# Patient Record
Sex: FEMALE | Race: ASIAN | NOT HISPANIC OR LATINO | URBAN - METROPOLITAN AREA
[De-identification: names, ages, dates, MRNs, and addresses within clinical notes are randomized per-mention and may not be internally consistent; named-entity substitution may affect disease eponyms.]

---

## 2022-09-26 ENCOUNTER — EMERGENCY (EMERGENCY)
Facility: HOSPITAL | Age: 57
LOS: 1 days | Discharge: HOME | End: 2022-09-26
Attending: STUDENT IN AN ORGANIZED HEALTH CARE EDUCATION/TRAINING PROGRAM | Admitting: STUDENT IN AN ORGANIZED HEALTH CARE EDUCATION/TRAINING PROGRAM

## 2022-09-26 VITALS
OXYGEN SATURATION: 100 % | HEART RATE: 74 BPM | DIASTOLIC BLOOD PRESSURE: 74 MMHG | RESPIRATION RATE: 18 BRPM | TEMPERATURE: 98 F | SYSTOLIC BLOOD PRESSURE: 175 MMHG

## 2022-09-26 VITALS
HEART RATE: 50 BPM | SYSTOLIC BLOOD PRESSURE: 145 MMHG | OXYGEN SATURATION: 100 % | DIASTOLIC BLOOD PRESSURE: 64 MMHG | TEMPERATURE: 98 F | RESPIRATION RATE: 18 BRPM

## 2022-09-26 LAB
ALBUMIN SERPL ELPH-MCNC: 4.6 G/DL — SIGNIFICANT CHANGE UP (ref 3.3–5)
ALP SERPL-CCNC: 67 U/L — SIGNIFICANT CHANGE UP (ref 40–120)
ALT FLD-CCNC: 13 U/L — SIGNIFICANT CHANGE UP (ref 4–33)
ANION GAP SERPL CALC-SCNC: 11 MMOL/L — SIGNIFICANT CHANGE UP (ref 7–14)
APPEARANCE UR: CLEAR — SIGNIFICANT CHANGE UP
AST SERPL-CCNC: 29 U/L — SIGNIFICANT CHANGE UP (ref 4–32)
BASOPHILS # BLD AUTO: 0.02 K/UL — SIGNIFICANT CHANGE UP (ref 0–0.2)
BASOPHILS NFR BLD AUTO: 0.5 % — SIGNIFICANT CHANGE UP (ref 0–2)
BILIRUB SERPL-MCNC: 0.6 MG/DL — SIGNIFICANT CHANGE UP (ref 0.2–1.2)
BILIRUB UR-MCNC: NEGATIVE — SIGNIFICANT CHANGE UP
BUN SERPL-MCNC: 12 MG/DL — SIGNIFICANT CHANGE UP (ref 7–23)
CALCIUM SERPL-MCNC: 9.8 MG/DL — SIGNIFICANT CHANGE UP (ref 8.4–10.5)
CHLORIDE SERPL-SCNC: 103 MMOL/L — SIGNIFICANT CHANGE UP (ref 98–107)
CO2 SERPL-SCNC: 25 MMOL/L — SIGNIFICANT CHANGE UP (ref 22–31)
COLOR SPEC: COLORLESS — SIGNIFICANT CHANGE UP
CREAT SERPL-MCNC: 0.82 MG/DL — SIGNIFICANT CHANGE UP (ref 0.5–1.3)
DIFF PNL FLD: NEGATIVE — SIGNIFICANT CHANGE UP
EGFR: 83 ML/MIN/1.73M2 — SIGNIFICANT CHANGE UP
EOSINOPHIL # BLD AUTO: 0.25 K/UL — SIGNIFICANT CHANGE UP (ref 0–0.5)
EOSINOPHIL NFR BLD AUTO: 6.1 % — HIGH (ref 0–6)
GLUCOSE SERPL-MCNC: 103 MG/DL — HIGH (ref 70–99)
GLUCOSE UR QL: NEGATIVE — SIGNIFICANT CHANGE UP
HCT VFR BLD CALC: 36.2 % — SIGNIFICANT CHANGE UP (ref 34.5–45)
HGB BLD-MCNC: 11.7 G/DL — SIGNIFICANT CHANGE UP (ref 11.5–15.5)
IANC: 2.43 K/UL — SIGNIFICANT CHANGE UP (ref 1.8–7.4)
IMM GRANULOCYTES NFR BLD AUTO: 0.5 % — SIGNIFICANT CHANGE UP (ref 0–0.9)
KETONES UR-MCNC: NEGATIVE — SIGNIFICANT CHANGE UP
LACTATE SERPL-SCNC: 1.2 MMOL/L — SIGNIFICANT CHANGE UP (ref 0.5–2)
LEUKOCYTE ESTERASE UR-ACNC: NEGATIVE — SIGNIFICANT CHANGE UP
LIDOCAIN IGE QN: 22 U/L — SIGNIFICANT CHANGE UP (ref 7–60)
LYMPHOCYTES # BLD AUTO: 1.09 K/UL — SIGNIFICANT CHANGE UP (ref 1–3.3)
LYMPHOCYTES # BLD AUTO: 26.7 % — SIGNIFICANT CHANGE UP (ref 13–44)
MCHC RBC-ENTMCNC: 28.4 PG — SIGNIFICANT CHANGE UP (ref 27–34)
MCHC RBC-ENTMCNC: 32.3 GM/DL — SIGNIFICANT CHANGE UP (ref 32–36)
MCV RBC AUTO: 87.9 FL — SIGNIFICANT CHANGE UP (ref 80–100)
MONOCYTES # BLD AUTO: 0.27 K/UL — SIGNIFICANT CHANGE UP (ref 0–0.9)
MONOCYTES NFR BLD AUTO: 6.6 % — SIGNIFICANT CHANGE UP (ref 2–14)
NEUTROPHILS # BLD AUTO: 2.43 K/UL — SIGNIFICANT CHANGE UP (ref 1.8–7.4)
NEUTROPHILS NFR BLD AUTO: 59.6 % — SIGNIFICANT CHANGE UP (ref 43–77)
NITRITE UR-MCNC: NEGATIVE — SIGNIFICANT CHANGE UP
NRBC # BLD: 0 /100 WBCS — SIGNIFICANT CHANGE UP (ref 0–0)
NRBC # FLD: 0 K/UL — SIGNIFICANT CHANGE UP (ref 0–0)
PH UR: 7 — SIGNIFICANT CHANGE UP (ref 5–8)
PLATELET # BLD AUTO: 217 K/UL — SIGNIFICANT CHANGE UP (ref 150–400)
POTASSIUM SERPL-MCNC: 4 MMOL/L — SIGNIFICANT CHANGE UP (ref 3.5–5.3)
POTASSIUM SERPL-SCNC: 4 MMOL/L — SIGNIFICANT CHANGE UP (ref 3.5–5.3)
PROT SERPL-MCNC: 7.2 G/DL — SIGNIFICANT CHANGE UP (ref 6–8.3)
PROT UR-MCNC: NEGATIVE — SIGNIFICANT CHANGE UP
RBC # BLD: 4.12 M/UL — SIGNIFICANT CHANGE UP (ref 3.8–5.2)
RBC # FLD: 12.4 % — SIGNIFICANT CHANGE UP (ref 10.3–14.5)
SODIUM SERPL-SCNC: 139 MMOL/L — SIGNIFICANT CHANGE UP (ref 135–145)
SP GR SPEC: 1.01 — LOW (ref 1.01–1.05)
TROPONIN T, HIGH SENSITIVITY RESULT: 7 NG/L — SIGNIFICANT CHANGE UP
UROBILINOGEN FLD QL: SIGNIFICANT CHANGE UP
WBC # BLD: 4.08 K/UL — SIGNIFICANT CHANGE UP (ref 3.8–10.5)
WBC # FLD AUTO: 4.08 K/UL — SIGNIFICANT CHANGE UP (ref 3.8–10.5)

## 2022-09-26 PROCEDURE — 74177 CT ABD & PELVIS W/CONTRAST: CPT | Mod: 26,MA

## 2022-09-26 PROCEDURE — 99285 EMERGENCY DEPT VISIT HI MDM: CPT

## 2022-09-26 RX ORDER — FAMOTIDINE 10 MG/ML
20 INJECTION INTRAVENOUS DAILY
Refills: 0 | Status: DISCONTINUED | OUTPATIENT
Start: 2022-09-26 | End: 2022-09-29

## 2022-09-26 RX ORDER — MORPHINE SULFATE 50 MG/1
4 CAPSULE, EXTENDED RELEASE ORAL ONCE
Refills: 0 | Status: DISCONTINUED | OUTPATIENT
Start: 2022-09-26 | End: 2022-09-26

## 2022-09-26 RX ADMIN — MORPHINE SULFATE 4 MILLIGRAM(S): 50 CAPSULE, EXTENDED RELEASE ORAL at 12:03

## 2022-09-26 RX ADMIN — FAMOTIDINE 20 MILLIGRAM(S): 10 INJECTION INTRAVENOUS at 12:03

## 2022-09-26 NOTE — ED PROVIDER NOTE - ATTENDING CONTRIBUTION TO CARE
56F h/o HTN, HLD, GERD p/w L sided abd pain x1day. States has chronic abdominal pain, but had acute worsening beginning last night. Notes recent CT 8/25 showing a 1.5cm pancreatic cystic lesion c/w papillary mucinous neoplasm. Describes pain as non-radiating, burning sensation. Took tylenol w/o relief. Also notes burning sensation of chest, non-radiating, without associate sob, dizziness/lightheadedness. Reports NBNB vomiting since the pain started a few weeks ago, unchanged at this time.  Denies fever/chills, known sick contacts or recent travels.   Gen: uncomfortable appearing, nad  CV: rrr  Pulm: clear lungs  Abd: soft, ND, generalized tenderness with no rebound/guarding, no rigidity, no CVA tenderness   Ext: no edema/swelling  Skin: wwp, no rash/petechiae/ecchymosis  MDM: 56F h/o HTN, HLD, GERD with recently dx pancreatic cystic lesion p/w worsening L sided abd pain x1day. DDx includes SBO vs. diverticulitis vs. pancreatitis vs. pancreatic cystic lesion. Will get labs, UA/UC, CT ab/pel. Analgesia and IVF provided.

## 2022-09-26 NOTE — ED PROCEDURE NOTE - PROCEDURE ADDITIONAL DETAILS
Emergency Department Focused Ultrasound performed at patient's bedside for educational purposes. The study will have a follow up study performed or was performed in the direct supervision of an ultrasound trained attending.     f/u CTAP    about [id number] L renal cyst; pt already aware of this renal cyst.

## 2022-09-26 NOTE — ED PROVIDER NOTE - NSFOLLOWUPINSTRUCTIONS_ED_ALL_ED_FT
Your diagnosis: Abdominal Pain (Pancreatic region)     Discharge instructions:    - Please follow up with your Primary Care Doctor and GI doctor. Your CT scan results were explained to you. Your results were stable and similar to the previous CT scan. You should follow up with your PCP and GI doctor within 2-3 days.     - Tylenol up to 650 mg every 8 hours as needed for pain.     - Be sure to return to the ED if you develop new or worsening symptoms. Specific signs and symptoms to be vigilant of: fever or chills, chest pain, difficulty breathing, palpitations, loss of consciousness, headache, vision changes, slurred speech, difficulty swallowing or drooling, facial droop, weakness in the arms or legs, numbness or tingling, abdominal pain, nausea or vomiting, diarrhea, constipation, blood in the stool or urine, pain on urination, difficulty urinating.

## 2022-09-26 NOTE — ED PROVIDER NOTE - PHYSICAL EXAMINATION
Physical Exam:    Gen: NAD, AOx3, non-toxic appearing, able to ambulate without assistance  Head: NCAT  HEENT: EOMI, PEERLA, normal conjunctiva, tongue midline, oral mucosa moist  Lung: CTAB, no respiratory distress, no wheezes/rhonchi/rales B/L, speaking in full sentences  CV: RRR, no murmurs, rubs or gallops  Abd: soft, NT, ND, no guarding, no rigidity, no rebound tenderness, no CVA tenderness   MSK: no visible deformities, ROM normal in UE/LE, no back pain  Neuro: No focal sensory or motor deficits  Skin: Warm, well perfused, no rash, no leg swelling

## 2022-09-26 NOTE — ED ADULT TRIAGE NOTE - CHIEF COMPLAINT QUOTE
patient states" I am having severe pain in my chest, abdomen and right arm since last night" h/o pancreatic cyst and chronic pain which got worst since last night".

## 2022-09-26 NOTE — ED ADULT NURSE NOTE - OBJECTIVE STATEMENT
Pt a&ox3 c/o chest pain radiating to rt shoulder and abdominal pain, intermittent for the past several days. Pt breathing even and unlabored, denies chest pain at present, abd soft, non-tender, non-distended, denies n/v/d, no dysuria/hematuria, afebrile, skin is cool dry and intact, ivl placed, labs collected and sent, medicated as ordered.

## 2022-09-26 NOTE — ED PROVIDER NOTE - PATIENT PORTAL LINK FT
You can access the FollowMyHealth Patient Portal offered by [facility] by registering at the following website: [email or web address]. By joining [facility]’s FollowMyHealth portal, you will also be able to view your health information using other applications (apps) compatible with our system.

## 2022-09-26 NOTE — ED PROVIDER NOTE - OBJECTIVE STATEMENT
55 y/o F with PMHx of HTN, HLD, GERD presenting with left sided abdominal pain since last night. States she recently had a CT scan don 08/25/22 showing a 1.5cm pancreatic cystic lesion representing a small side branch intraductal papillary mucinous neoplasm without evidence of worrisome features. States she was referred by her PCP Dr. Helen Pizarro to a GI specialist Dr. Rajendra Thapa for the scan. States the abdominal pain is intermittent, non radiating and a 5/10. States she also has had a "burning sensation" in her chest. States that she has taken Tylenol OTC for some relief. States she has had nonbloody nonbilious vomiting since the pain started a few weeks ago but denies any vomiting this morning. Denies any fever, chills, shortness of breath, diarrhea. States hx of previous abdominal surgeries with hysterectomy and cholecystectomy

## 2022-09-26 NOTE — ED PROVIDER NOTE - PROGRESS NOTE DETAILS
Resident: Shadi Pineda, PGY1 – Pt was re-evaluated at bedside, VSS, feeling better overall. Results were discussed with patient and explained stable CT results in comparison to previous scan done outpatient. Time was taken to answer any questions that the patient had before providing them with discharge paperwork. Patient to be PO challenged prior to discharge and explained to f/u with GI (Dr. Rajendra Thapa) Resident: Shadi Pineda, PGY1 – Pt was re-evaluated at bedside, VSS, feeling better overall. Results were discussed with patient and explained stable CT results in comparison to previous scan done outpatient. Time was taken to answer any questions that the patient had before providing them with discharge paperwork. Patient to be PO challenged prior to discharge and passed and explained to f/u with GI (Dr. Rajendra Thapa)

## 2022-09-26 NOTE — ED PROVIDER NOTE - NS ED ROS FT
CONSTITUTIONAL: No fevers, no chills  EYES: no visual changes, no eye pain  EARS: no ear drainage, no ear pain, no change in hearing  NOSE: no nasal congestion  MOUTH/THROAT: no sore throat  CV: +chest pain, no palpitations  RESP: No SOB, no cough  GI: +abdominal pain   : no dysuria, no hematuria, no flank pain  MSK: no back pain, no extremity pain  SKIN: no rashes

## 2022-09-26 NOTE — ED ADULT NURSE REASSESSMENT NOTE - NS ED NURSE REASSESS COMMENT FT1
Break coverage RN: Pt A&Ox4 resting on stretcher. Respirations even and unlabored. Pt offers no complaints at this time. Urine sent per order. pending CT results. bed in lowest position, side rails up, call bell in hand, safety maintained.

## 2022-10-03 ENCOUNTER — INPATIENT (INPATIENT)
Facility: HOSPITAL | Age: 57
LOS: 1 days | Discharge: HOME | End: 2022-10-05
Attending: INTERNAL MEDICINE | Admitting: INTERNAL MEDICINE

## 2022-10-03 VITALS
RESPIRATION RATE: 16 BRPM | DIASTOLIC BLOOD PRESSURE: 72 MMHG | TEMPERATURE: 98 F | SYSTOLIC BLOOD PRESSURE: 154 MMHG | OXYGEN SATURATION: 100 % | HEART RATE: 69 BPM

## 2022-10-03 DIAGNOSIS — K86.9 DISEASE OF PANCREAS, UNSPECIFIED: ICD-10-CM

## 2022-10-03 DIAGNOSIS — Z98.890 OTHER SPECIFIED POSTPROCEDURAL STATES: Chronic | ICD-10-CM

## 2022-10-03 DIAGNOSIS — Z90.710 ACQUIRED ABSENCE OF BOTH CERVIX AND UTERUS: Chronic | ICD-10-CM

## 2022-10-03 DIAGNOSIS — Z90.49 ACQUIRED ABSENCE OF OTHER SPECIFIED PARTS OF DIGESTIVE TRACT: Chronic | ICD-10-CM

## 2022-10-03 DIAGNOSIS — Z98.42 CATARACT EXTRACTION STATUS, LEFT EYE: Chronic | ICD-10-CM

## 2022-10-03 LAB
ALBUMIN SERPL ELPH-MCNC: 4.9 G/DL — SIGNIFICANT CHANGE UP (ref 3.3–5)
ALP SERPL-CCNC: 72 U/L — SIGNIFICANT CHANGE UP (ref 40–120)
ALT FLD-CCNC: 15 U/L — SIGNIFICANT CHANGE UP (ref 4–33)
ANION GAP SERPL CALC-SCNC: 14 MMOL/L — SIGNIFICANT CHANGE UP (ref 7–14)
AST SERPL-CCNC: 19 U/L — SIGNIFICANT CHANGE UP (ref 4–32)
BASOPHILS # BLD AUTO: 0.03 K/UL — SIGNIFICANT CHANGE UP (ref 0–0.2)
BASOPHILS NFR BLD AUTO: 0.6 % — SIGNIFICANT CHANGE UP (ref 0–2)
BILIRUB SERPL-MCNC: 0.4 MG/DL — SIGNIFICANT CHANGE UP (ref 0.2–1.2)
BUN SERPL-MCNC: 14 MG/DL — SIGNIFICANT CHANGE UP (ref 7–23)
CALCIUM SERPL-MCNC: 9.5 MG/DL — SIGNIFICANT CHANGE UP (ref 8.4–10.5)
CHLORIDE SERPL-SCNC: 105 MMOL/L — SIGNIFICANT CHANGE UP (ref 98–107)
CO2 SERPL-SCNC: 24 MMOL/L — SIGNIFICANT CHANGE UP (ref 22–31)
CREAT SERPL-MCNC: 0.88 MG/DL — SIGNIFICANT CHANGE UP (ref 0.5–1.3)
EGFR: 77 ML/MIN/1.73M2 — SIGNIFICANT CHANGE UP
EOSINOPHIL # BLD AUTO: 0.22 K/UL — SIGNIFICANT CHANGE UP (ref 0–0.5)
EOSINOPHIL NFR BLD AUTO: 4.2 % — SIGNIFICANT CHANGE UP (ref 0–6)
GLUCOSE SERPL-MCNC: 94 MG/DL — SIGNIFICANT CHANGE UP (ref 70–99)
HCT VFR BLD CALC: 40.7 % — SIGNIFICANT CHANGE UP (ref 34.5–45)
HGB BLD-MCNC: 12.6 G/DL — SIGNIFICANT CHANGE UP (ref 11.5–15.5)
IANC: 3.01 K/UL — SIGNIFICANT CHANGE UP (ref 1.8–7.4)
IMM GRANULOCYTES NFR BLD AUTO: 0.4 % — SIGNIFICANT CHANGE UP (ref 0–0.9)
LIDOCAIN IGE QN: 28 U/L — SIGNIFICANT CHANGE UP (ref 7–60)
LYMPHOCYTES # BLD AUTO: 1.54 K/UL — SIGNIFICANT CHANGE UP (ref 1–3.3)
LYMPHOCYTES # BLD AUTO: 29.7 % — SIGNIFICANT CHANGE UP (ref 13–44)
MCHC RBC-ENTMCNC: 28.3 PG — SIGNIFICANT CHANGE UP (ref 27–34)
MCHC RBC-ENTMCNC: 31 GM/DL — LOW (ref 32–36)
MCV RBC AUTO: 91.5 FL — SIGNIFICANT CHANGE UP (ref 80–100)
MONOCYTES # BLD AUTO: 0.37 K/UL — SIGNIFICANT CHANGE UP (ref 0–0.9)
MONOCYTES NFR BLD AUTO: 7.1 % — SIGNIFICANT CHANGE UP (ref 2–14)
NEUTROPHILS # BLD AUTO: 3.01 K/UL — SIGNIFICANT CHANGE UP (ref 1.8–7.4)
NEUTROPHILS NFR BLD AUTO: 58 % — SIGNIFICANT CHANGE UP (ref 43–77)
NRBC # BLD: 0 /100 WBCS — SIGNIFICANT CHANGE UP (ref 0–0)
NRBC # FLD: 0 K/UL — SIGNIFICANT CHANGE UP (ref 0–0)
PLATELET # BLD AUTO: 225 K/UL — SIGNIFICANT CHANGE UP (ref 150–400)
POTASSIUM SERPL-MCNC: 3.6 MMOL/L — SIGNIFICANT CHANGE UP (ref 3.5–5.3)
POTASSIUM SERPL-SCNC: 3.6 MMOL/L — SIGNIFICANT CHANGE UP (ref 3.5–5.3)
PROT SERPL-MCNC: 7.5 G/DL — SIGNIFICANT CHANGE UP (ref 6–8.3)
RBC # BLD: 4.45 M/UL — SIGNIFICANT CHANGE UP (ref 3.8–5.2)
RBC # FLD: 12.6 % — SIGNIFICANT CHANGE UP (ref 10.3–14.5)
SODIUM SERPL-SCNC: 143 MMOL/L — SIGNIFICANT CHANGE UP (ref 135–145)
WBC # BLD: 5.19 K/UL — SIGNIFICANT CHANGE UP (ref 3.8–10.5)
WBC # FLD AUTO: 5.19 K/UL — SIGNIFICANT CHANGE UP (ref 3.8–10.5)

## 2022-10-03 PROCEDURE — 99285 EMERGENCY DEPT VISIT HI MDM: CPT

## 2022-10-03 RX ORDER — ATORVASTATIN CALCIUM 80 MG/1
10 TABLET, FILM COATED ORAL AT BEDTIME
Refills: 0 | Status: DISCONTINUED | OUTPATIENT
Start: 2022-10-03 | End: 2022-10-05

## 2022-10-03 RX ORDER — SUCRALFATE 1 G
1 TABLET ORAL
Refills: 0 | Status: DISCONTINUED | OUTPATIENT
Start: 2022-10-03 | End: 2022-10-05

## 2022-10-03 RX ORDER — PANTOPRAZOLE SODIUM 20 MG/1
40 TABLET, DELAYED RELEASE ORAL
Refills: 0 | Status: DISCONTINUED | OUTPATIENT
Start: 2022-10-03 | End: 2022-10-05

## 2022-10-03 RX ORDER — SODIUM CHLORIDE 9 MG/ML
1000 INJECTION INTRAMUSCULAR; INTRAVENOUS; SUBCUTANEOUS ONCE
Refills: 0 | Status: COMPLETED | OUTPATIENT
Start: 2022-10-03 | End: 2022-10-03

## 2022-10-03 RX ORDER — MORPHINE SULFATE 50 MG/1
2 CAPSULE, EXTENDED RELEASE ORAL ONCE
Refills: 0 | Status: DISCONTINUED | OUTPATIENT
Start: 2022-10-03 | End: 2022-10-03

## 2022-10-03 RX ORDER — ACETAMINOPHEN 500 MG
650 TABLET ORAL EVERY 6 HOURS
Refills: 0 | Status: DISCONTINUED | OUTPATIENT
Start: 2022-10-03 | End: 2022-10-05

## 2022-10-03 RX ADMIN — SODIUM CHLORIDE 1000 MILLILITER(S): 9 INJECTION INTRAMUSCULAR; INTRAVENOUS; SUBCUTANEOUS at 20:16

## 2022-10-03 NOTE — H&P ADULT - NSICDXFAMILYHX_GEN_ALL_CORE_FT
FAMILY HISTORY:  Father  Still living? Unknown  FHx: hepatic cirrhosis, Age at diagnosis: Age Unknown    Mother  Still living? Unknown  FH: hypertension, Age at diagnosis: Age Unknown  FHx: stroke, Age at diagnosis: Age Unknown

## 2022-10-03 NOTE — H&P ADULT - NSHPSOCIALHISTORY_GEN_ALL_CORE
Denies alcohol, tobacco, and substance use  Denies herbal medication and supplement use   Lives in [place] with    Only one relative nearby, the patient's PCP  Previously worked in [employer], since retired 15 years ago   Emigrated from [facility] 18 years ago

## 2022-10-03 NOTE — ED ADULT NURSE NOTE - NSIMPLEMENTINTERV_GEN_ALL_ED
Implemented All Universal Safety Interventions:  [place] to call system. Call bell, personal items and telephone within reach. Instruct patient to call for assistance. Room bathroom lighting operational. Non-slip footwear when patient is off stretcher. Physically safe environment: no spills, clutter or unnecessary equipment. Stretcher in lowest position, wheels locked, appropriate side rails in place.

## 2022-10-03 NOTE — H&P ADULT - NSICDXPASTMEDICALHX_GEN_ALL_CORE_FT
PAST MEDICAL HISTORY:  GERD (gastroesophageal reflux disease)     H/O varicose veins     HLD (hyperlipidemia)     HTN (hypertension)     Umbilical hernia      PAST MEDICAL HISTORY:  GERD (gastroesophageal reflux disease)     Gout     H/O varicose veins     HLD (hyperlipidemia)     HTN (hypertension)     Umbilical hernia

## 2022-10-03 NOTE — H&P ADULT - PROBLEM SELECTOR PLAN 2
CT abdomen shows 1.4 cm pancreatic cystic lesion and additional subcentimeter hypodensity   - GI consult   - May require repeat of MRI CT abdomen shows 1.4 cm pancreatic cystic lesion and additional subcentimeter hypodensity   - GI consult   - May require repeat of MRI, was told by her GI that she would need EUS

## 2022-10-03 NOTE — H&P ADULT - NSHPPHYSICALEXAM_GEN_ALL_CORE
VITALS:   T(C): 37 (10-03-22 @ 23:53), Max: 37 (10-03-22 @ 23:53)  HR: 66 (10-03-22 @ 23:53) (66 - 69)  BP: 131/91 (10-03-22 @ 23:53) (131/91 - 154/72)  RR: 18 (10-03-22 @ 23:53) (16 - 18)  SpO2: 100% (10-03-22 @ 23:53) (100% - 100%)    GENERAL: NAD, lying in bed comfortably  HEAD:  Atraumatic, normocephalic  EYES: EOMI, PERRLA, conjunctiva and sclera clear  ENT: Moist mucous membranes  NECK: Supple, no JVD  HEART: Regular rate and rhythm, no murmurs, rubs, or gallops  LUNGS: Unlabored respirations.  Clear to auscultation bilaterally, no crackles, wheezing, or rhonchi  ABDOMEN: Soft, tender to palpation in LUQ, LLQ, epigastric region, nondistended, umbilical hernia mildly tender   EXTREMITIES: 2+ peripheral pulses bilaterally. No clubbing, cyanosis, or edema  NERVOUS SYSTEM:  A&Ox3, no focal deficits   SKIN: B/l LE pruritic rashes VITALS:   T(C): 37 (10-03-22 @ 23:53), Max: 37 (10-03-22 @ 23:53)  HR: 66 (10-03-22 @ 23:53) (66 - 69)  BP: 131/91 (10-03-22 @ 23:53) (131/91 - 154/72)  RR: 18 (10-03-22 @ 23:53) (16 - 18)  SpO2: 100% (10-03-22 @ 23:53) (100% - 100%)    GENERAL: NAD, lying in bed comfortably  HEAD:  Atraumatic, normocephalic  EYES: EOMI, PERRLA, conjunctiva and sclera clear  ENT: Moist mucous membranes  NECK: Supple, no JVD  HEART: Regular rate and rhythm, no murmurs, rubs, or gallops  LUNGS: Unlabored respirations.  Clear to auscultation bilaterally, no crackles, wheezing, or rhonchi  ABDOMEN: Soft, tender to palpation in LUQ, LLQ, epigastric region, nondistended, umbilical hernia mildly tender   EXTREMITIES: 2+ peripheral pulses bilaterally. No clubbing, cyanosis, or edema  NERVOUS SYSTEM:  A&Ox3, no focal deficits   SKIN: B/l mild LE pruritic rashes

## 2022-10-03 NOTE — H&P ADULT - NSHPREVIEWOFSYSTEMS_GEN_ALL_CORE
REVIEW OF SYSTEMS:    CONSTITUTIONAL: No weakness, fevers or chills  EYES/ENT: No visual changes;  No vertigo or throat pain   NECK: No pain or stiffness  RESPIRATORY: No cough, wheezing, hemoptysis; No shortness of breath  CARDIOVASCULAR: No chest pain or palpitations  GASTROINTESTINAL: No abdominal or epigastric pain. No nausea, vomiting, or hematemesis; No diarrhea or constipation. No melena or hematochezia.  GENITOURINARY: No dysuria, frequency or hematuria  NEUROLOGICAL: No numbness or weakness  SKIN: No itching, rashes REVIEW OF SYSTEMS:    CONSTITUTIONAL: + weakness, No fevers or chills  EYES/ENT: No visual changes;  No vertigo or throat pain   NECK: No pain or stiffness  RESPIRATORY: No cough, wheezing, hemoptysis; No shortness of breath  CARDIOVASCULAR: No chest pain or palpitations  GASTROINTESTINAL: + abdominal and epigastric pain. + nausea, No vomiting, or hematemesis; No diarrhea or constipation. No melena or hematochezia.  GENITOURINARY: No dysuria, frequency or hematuria  NEUROLOGICAL: No numbness or weakness  SKIN: Chronic LE rashes REVIEW OF SYSTEMS:    CONSTITUTIONAL: + weakness, No fevers or chills  EYES/ENT: No visual changes;  No vertigo or throat pain   NECK: No pain or stiffness  RESPIRATORY: No cough, wheezing, hemoptysis; No shortness of breath  CARDIOVASCULAR: No chest pain or palpitations  GASTROINTESTINAL: + abdominal and epigastric pain. + nausea, No vomiting, or hematemesis; No diarrhea or constipation. No melena or hematochezia.  GENITOURINARY: No dysuria, frequency or hematuria  NEUROLOGICAL: No numbness or weakness  MSK: no back pain  SKIN: Chronic LE rashes

## 2022-10-03 NOTE — H&P ADULT - NSHPLABSRESULTS_GEN_ALL_CORE
Comprehensive Metabolic Panel (10.03.22 @ 18:10)   Sodium, Serum: 143 mmol/L   Potassium, Serum: 3.6 mmol/L   Chloride, Serum: 105 mmol/L   Carbon Dioxide, Serum: 24 mmol/L   Anion Gap, Serum: 14 mmol/L   Blood Urea Nitrogen, Serum: 14 mg/dL   Creatinine, Serum: 0.88 mg/dL   Glucose, Serum: 94 mg/dL   Calcium, Total Serum: 9.5 mg/dL   Protein Total, Serum: 7.5 g/dL   Albumin, Serum: 4.9 g/dL   Bilirubin Total, Serum: 0.4 mg/dL   Alkaline Phosphatase, Serum: 72 U/L   Aspartate Aminotransferase (AST/SGOT): 19 U/L   Alanine Aminotransferase (ALT/SGPT): 15 U/L   eGFR: 77Complete Blood Count + Automated Diff (10.03.22 @ 18:10)   IANC: 3.01: IANC (instrument absolute neutrophil count) is based on the instrument   calculation which may differ from ANC (manual absolute neutrophil count)   since it is based on the calculation from a manual differential. K/uL   Nucleated RBC #: 0.00 K/uL   WBC Count: 5.19 K/uL   RBC Count: 4.45 M/uL   Hemoglobin: 12.6 g/dL   Hematocrit: 40.7 %   Mean Cell Volume: 91.5 fL   Mean Cell Hemoglobin: 28.3 pg   Mean Cell Hemoglobin Conc: 31.0 gm/dL   Red Cell Distrib Width: 12.6 %   Platelet Count - Automated: 225 K/uL   Auto Neutrophil #: 3.01 K/uL   Auto Lymphocyte #: 1.54 K/uL   Auto Monocyte #: 0.37 K/uL   Auto Eosinophil #: 0.22 K/uL   Auto Basophil #: 0.03 K/uL   Auto Neutrophil %: 58.0: Differential percentages must be correlated with absolute numbers for   clinical significance. %   Auto Lymphocyte %: 29.7 %   Auto Monocyte %: 7.1 %   Auto Eosinophil %: 4.2 %   Auto Basophil %: 0.6 %   Auto Immature Granulocyte %: 0.4: (Includes meta, myelo and promyelocytes). Mild elevations in immature   granulocytes may be seen with many inflammatory processes and pregnancy;   clinical correlation suggested. %   Nucleated RBC: 0 /100 WBCs Urinalysis (09.26.22 @ 16:26)   Glucose Qualitative, Urine: Negative   Blood, Urine: Negative   pH Urine: 7.0   Color: Colorless   Urine Appearance: Clear   Bilirubin: Negative   Ketone - Urine: Negative   Specific Gravity: 1.007   Protein, Urine: Negative   Urobilinogen: <2 mg/dL   Nitrite: Negative   Leukocyte Esterase Concentration: Negative < from: CT Abdomen and Pelvis w/ IV Cont (09.26.22 @ 15:53) >    IMPRESSION:  No acute intra-abdominal findings.    1.4 cm pancreatic cystic lesion and additional subcentimeter hypodensity.   Recommend correlation with prior imaging if available for comparison or   follow-up with nonemergent MRI/MRCP for further characterization.    < end of copied text > Comprehensive Metabolic Panel (10.03.22 @ 18:10)   Sodium, Serum: 143 mmol/L   Potassium, Serum: 3.6 mmol/L   Chloride, Serum: 105 mmol/L   Carbon Dioxide, Serum: 24 mmol/L   Anion Gap, Serum: 14 mmol/L   Blood Urea Nitrogen, Serum: 14 mg/dL   Creatinine, Serum: 0.88 mg/dL   Glucose, Serum: 94 mg/dL   Calcium, Total Serum: 9.5 mg/dL   Protein Total, Serum: 7.5 g/dL   Albumin, Serum: 4.9 g/dL   Bilirubin Total, Serum: 0.4 mg/dL   Alkaline Phosphatase, Serum: 72 U/L   Aspartate Aminotransferase (AST/SGOT): 19 U/L   Alanine Aminotransferase (ALT/SGPT): 15 U/L   eGFR: 77Complete Blood Count + Automated Diff (10.03.22 @ 18:10)   IANC: 3.01: IANC (instrument absolute neutrophil count) is based on the instrument   calculation which may differ from ANC (manual absolute neutrophil count)   since it is based on the calculation from a manual differential. K/uL   Nucleated RBC #: 0.00 K/uL   WBC Count: 5.19 K/uL   RBC Count: 4.45 M/uL   Hemoglobin: 12.6 g/dL   Hematocrit: 40.7 %   Mean Cell Volume: 91.5 fL   Mean Cell Hemoglobin: 28.3 pg   Mean Cell Hemoglobin Conc: 31.0 gm/dL   Red Cell Distrib Width: 12.6 %   Platelet Count - Automated: 225 K/uL   Auto Neutrophil #: 3.01 K/uL   Auto Lymphocyte #: 1.54 K/uL   Auto Monocyte #: 0.37 K/uL   Auto Eosinophil #: 0.22 K/uL   Auto Basophil #: 0.03 K/uL   Auto Neutrophil %: 58.0: Differential percentages must be correlated with absolute numbers for   clinical significance. %   Auto Lymphocyte %: 29.7 %   Auto Monocyte %: 7.1 %   Auto Eosinophil %: 4.2 %   Auto Basophil %: 0.6 %   Auto Immature Granulocyte %: 0.4: (Includes meta, myelo and promyelocytes). Mild elevations in immature   granulocytes may be seen with many inflammatory processes and pregnancy;   clinical correlation suggested. %   Nucleated RBC: 0 /100 WBCs Urinalysis (09.26.22 @ 16:26)   Glucose Qualitative, Urine: Negative   Blood, Urine: Negative   pH Urine: 7.0   Color: Colorless   Urine Appearance: Clear   Bilirubin: Negative   Ketone - Urine: Negative   Specific Gravity: 1.007   Protein, Urine: Negative   Urobilinogen: <2 mg/dL   Nitrite: Negative   Leukocyte Esterase Concentration: Negative < from: CT Abdomen and Pelvis w/ IV Cont (09.26.22 @ 15:53) >    IMPRESSION:  No acute intra-abdominal findings.    1.4 cm pancreatic cystic lesion and additional subcentimeter hypodensity.   Recommend correlation with prior imaging if available for comparison or   follow-up with nonemergent MRI/MRCP for further characterization.    < end of copied text >    EKG: normal sinus rhythm

## 2022-10-03 NOTE — ED ADULT NURSE REASSESSMENT NOTE - SYMPTOMS
better now decreased to 3-4, doesn't want Instructions given not to drive or drink alcohol after taking percocet, verbalized understanding. Family here to drive patient home. at this time/abdominal pain

## 2022-10-03 NOTE — H&P ADULT - ATTENDING COMMENTS
55 yo F with PMHx of mult abd surgeries, hyperlipidemia, GERD, hypertension, umbilical hernia, and recent diagnosis of 1.4 cm pancreatic cystic lesion (was told likely IPMN) presents to the hospital with continued epigastric/periumbilical abd pain ongoing for a while, acute on chronic and at times worse with food. She was told by her GI that she needed EUS. She has known gastric ulcer from EGD recently and is on PPI. Her vitals were stable on arrival. Exam minimally ttp epigastrum. Will continue home PPI and sucralfate. Consult GI this AM.

## 2022-10-03 NOTE — ED PROVIDER NOTE - CLINICAL SUMMARY MEDICAL DECISION MAKING FREE TEXT BOX
Will send labs and admit the pt for intractable pain and further management of the pancreatic lesion.

## 2022-10-03 NOTE — H&P ADULT - PROBLEM SELECTOR PLAN 1
Pancreatic cystic lesion vs. gastritis/peptic ulcer disease   Less concern for pancreatitis and bowel obstruction due to low lipase and ability to tolerate soft foods without vomiting   - Keep NPO   - Tylenol PRN for pain Pancreatic cystic lesion vs. gastritis/peptic ulcer disease   Less concern for pancreatitis and bowel obstruction due to low lipase and ability to tolerate soft foods without vomiting   - Keep NPO except medications   - Tylenol PRN for pain Pancreatic cystic lesion vs. gastritis/peptic ulcer disease   Less concern for pancreatitis and bowel obstruction due to low lipase and ability to tolerate soft foods without vomiting   - Keep NPO except medications   - Tylenol PRN for pain  - continue PPI  - GI consult this AM

## 2022-10-03 NOTE — H&P ADULT - ASSESSMENT
55 yo F with PMHx of hyperlipidemia, GERD, hypertension, umbilical hernia, and gout presents with persistent abdominal pain.

## 2022-10-03 NOTE — H&P ADULT - HISTORY OF PRESENT ILLNESS
57 yo F with PMHx of hyperlipidemia, GERD, hypertension, umbilical hernia presents with  55 yo F with PMHx of hyperlipidemia, GERD, hypertension, umbilical hernia presents with abdominal pain, persistent, worsen in past 1 day. Patient was previously in ED for abdominal pain in LUQ on 9/26/2022, was evaluated with CT scan, which showed IPMN in pancreas, and was subsequently discharged. Patient reports recent pain worsened prior to breakfast, associated with nausea and desire to vomit, without actual emesis. Patient also reports   Patient was also seen by outpatient GI Rajendra Esteban, who sent the patient for ultrasound of the abdomen, which confirmed a ventral hernia and a pancreatic cystic lesion 1.5 cm without evidence of "worrisome features", as well as an MRI abdomen, which showed mildly dilated CBD w/o intrahepatic biliary dilation s/p cholecystectomy but did not visualize the pancreatic cyst. Patient reports she previously underwent an EGD one month ago and a colonoscopy 4 months ago, only notable for a "gastric ulcer". Patient was told by outpatient GI to come to ED for endoscopic "biopsies".  55 yo F with PMHx of hyperlipidemia, GERD, hypertension, umbilical hernia, and gout presents with abdominal pain, persistent, worsened in past 1 day. Patient was previously in ED for abdominal pain in [facility] on 9/26/2022, was evaluated with CT scan, which showed 1.4 cm pancreatic cystic lesion and additional subcentimeter hypodensity, and was subsequently discharged without admission. Patient reports recent pain worsened prior to breakfast, associated with nausea and desire to vomit, without actual emesis. Pain was initially 5-6/10, but improved to 3-4/10 after taking tylenol. Patient also reports gas and epigastric "burning" associated with eating food, improved with eating softer foods. Patient also endorses dizziness with occasional "light-headedness" but denies syncope or falling. Patient denies shortness of breath, chest pain, dysuria, or constipation.   Patient was also seen by outpatient GI Rajendra Esteban, who sent the patient for ultrasound of the abdomen, which confirmed a ventral hernia and a "1.5 cm pancreatic cystic lesion, likely representing a small side-branch IPMN without evidence of worrisome features", as well as an MRI abdomen, which showed mildly dilated CBD w/o intrahepatic biliary dilation s/p cholecystectomy but did not visualize the pancreatic cyst. Patient reports she previously underwent an EGD and colonoscopy 4 months ago, only notable for a "gastric ulcer". Patient was told by outpatient GI to come to ED for endoscopic "biopsies".

## 2022-10-03 NOTE — H&P ADULT - NSICDXPASTSURGICALHX_GEN_ALL_CORE_FT
PAST SURGICAL HISTORY:  S/P cholecystectomy     S/P hysterectomy     S/P sclerotherapy of varicose veins     Status post laser cataract surgery of left eye

## 2022-10-03 NOTE — ED ADULT NURSE NOTE - OBJECTIVE STATEMENT
55 y/o A&Ox4, PMH of "cyst on pancreas and liver", presents to ED for abdominal pain. Respirations are even and unlabored, sating at 100% on RA, 20 G to L aC placed, labs sent.

## 2022-10-03 NOTE — ED ADULT NURSE NOTE - NSFALLRSKASSESSDT_ED_ALL_ED
Quality 226: Preventive Care And Screening: Tobacco Use: Screening And Cessation Intervention: Patient screened for tobacco use and is an ex/non-smoker
Detail Level: Detailed
Quality 130: Documentation Of Current Medications In The Medical Record: Current Medications Documented
Quality 431: Preventive Care And Screening: Unhealthy Alcohol Use - Screening: Patient screened for unhealthy alcohol use using a single question and scores less than 2 times per year
03-Oct-2022 20:19

## 2022-10-03 NOTE — ED ADULT TRIAGE NOTE - CHIEF COMPLAINT QUOTE
Pt. c/o LUQ pain radiating to back, nausea and dizziness. States she has a cyst on her pancreas and liver. Seen last week for the same thing. Denies vomiting or fevers.

## 2022-10-04 DIAGNOSIS — I10 ESSENTIAL (PRIMARY) HYPERTENSION: ICD-10-CM

## 2022-10-04 DIAGNOSIS — E78.5 HYPERLIPIDEMIA, UNSPECIFIED: ICD-10-CM

## 2022-10-04 DIAGNOSIS — R10.9 UNSPECIFIED ABDOMINAL PAIN: ICD-10-CM

## 2022-10-04 DIAGNOSIS — K86.2 CYST OF PANCREAS: ICD-10-CM

## 2022-10-04 DIAGNOSIS — Z29.9 ENCOUNTER FOR PROPHYLACTIC MEASURES, UNSPECIFIED: ICD-10-CM

## 2022-10-04 DIAGNOSIS — K21.9 GASTRO-ESOPHAGEAL REFLUX DISEASE WITHOUT ESOPHAGITIS: ICD-10-CM

## 2022-10-04 LAB
ANION GAP SERPL CALC-SCNC: 12 MMOL/L — SIGNIFICANT CHANGE UP (ref 7–14)
BUN SERPL-MCNC: 12 MG/DL — SIGNIFICANT CHANGE UP (ref 7–23)
CALCIUM SERPL-MCNC: 8.8 MG/DL — SIGNIFICANT CHANGE UP (ref 8.4–10.5)
CHLORIDE SERPL-SCNC: 109 MMOL/L — HIGH (ref 98–107)
CO2 SERPL-SCNC: 25 MMOL/L — SIGNIFICANT CHANGE UP (ref 22–31)
CREAT SERPL-MCNC: 0.86 MG/DL — SIGNIFICANT CHANGE UP (ref 0.5–1.3)
EGFR: 79 ML/MIN/1.73M2 — SIGNIFICANT CHANGE UP
FLUAV AG NPH QL: SIGNIFICANT CHANGE UP
FLUBV AG NPH QL: SIGNIFICANT CHANGE UP
GLUCOSE BLDC GLUCOMTR-MCNC: 87 MG/DL — SIGNIFICANT CHANGE UP (ref 70–99)
GLUCOSE SERPL-MCNC: 88 MG/DL — SIGNIFICANT CHANGE UP (ref 70–99)
HCT VFR BLD CALC: 35 % — SIGNIFICANT CHANGE UP (ref 34.5–45)
HCV AB S/CO SERPL IA: 0.1 S/CO — SIGNIFICANT CHANGE UP (ref 0–0.99)
HCV AB SERPL-IMP: SIGNIFICANT CHANGE UP
HGB BLD-MCNC: 11.2 G/DL — LOW (ref 11.5–15.5)
MAGNESIUM SERPL-MCNC: 2.1 MG/DL — SIGNIFICANT CHANGE UP (ref 1.6–2.6)
MCHC RBC-ENTMCNC: 28.2 PG — SIGNIFICANT CHANGE UP (ref 27–34)
MCHC RBC-ENTMCNC: 32 GM/DL — SIGNIFICANT CHANGE UP (ref 32–36)
MCV RBC AUTO: 88.2 FL — SIGNIFICANT CHANGE UP (ref 80–100)
NRBC # BLD: 0 /100 WBCS — SIGNIFICANT CHANGE UP (ref 0–0)
NRBC # FLD: 0 K/UL — SIGNIFICANT CHANGE UP (ref 0–0)
PHOSPHATE SERPL-MCNC: 3.1 MG/DL — SIGNIFICANT CHANGE UP (ref 2.5–4.5)
PLATELET # BLD AUTO: 197 K/UL — SIGNIFICANT CHANGE UP (ref 150–400)
POTASSIUM SERPL-MCNC: 3.5 MMOL/L — SIGNIFICANT CHANGE UP (ref 3.5–5.3)
POTASSIUM SERPL-SCNC: 3.5 MMOL/L — SIGNIFICANT CHANGE UP (ref 3.5–5.3)
RBC # BLD: 3.97 M/UL — SIGNIFICANT CHANGE UP (ref 3.8–5.2)
RBC # FLD: 12.6 % — SIGNIFICANT CHANGE UP (ref 10.3–14.5)
RSV RNA NPH QL NAA+NON-PROBE: SIGNIFICANT CHANGE UP
SARS-COV-2 RNA SPEC QL NAA+PROBE: SIGNIFICANT CHANGE UP
SODIUM SERPL-SCNC: 146 MMOL/L — HIGH (ref 135–145)
WBC # BLD: 4.82 K/UL — SIGNIFICANT CHANGE UP (ref 3.8–10.5)
WBC # FLD AUTO: 4.82 K/UL — SIGNIFICANT CHANGE UP (ref 3.8–10.5)

## 2022-10-04 PROCEDURE — 43239 EGD BIOPSY SINGLE/MULTIPLE: CPT | Mod: 59,GC

## 2022-10-04 PROCEDURE — 12345: CPT | Mod: NC

## 2022-10-04 PROCEDURE — 99223 1ST HOSP IP/OBS HIGH 75: CPT | Mod: GC

## 2022-10-04 PROCEDURE — 88305 TISSUE EXAM BY PATHOLOGIST: CPT | Mod: 26

## 2022-10-04 PROCEDURE — 43242 EGD US FINE NEEDLE BX/ASPIR: CPT | Mod: GC

## 2022-10-04 RX ORDER — VALSARTAN 80 MG/1
80 TABLET ORAL DAILY
Refills: 0 | Status: DISCONTINUED | OUTPATIENT
Start: 2022-10-04 | End: 2022-10-05

## 2022-10-04 RX ORDER — ATORVASTATIN CALCIUM 80 MG/1
1 TABLET, FILM COATED ORAL
Qty: 0 | Refills: 0 | DISCHARGE

## 2022-10-04 RX ORDER — PANTOPRAZOLE SODIUM 20 MG/1
1 TABLET, DELAYED RELEASE ORAL
Qty: 0 | Refills: 0 | DISCHARGE

## 2022-10-04 RX ORDER — HYDROCHLOROTHIAZIDE 25 MG
12.5 TABLET ORAL DAILY
Refills: 0 | Status: DISCONTINUED | OUTPATIENT
Start: 2022-10-04 | End: 2022-10-05

## 2022-10-04 RX ORDER — SUCRALFATE 1 G
1 TABLET ORAL
Qty: 0 | Refills: 0 | DISCHARGE

## 2022-10-04 RX ORDER — ENOXAPARIN SODIUM 100 MG/ML
40 INJECTION SUBCUTANEOUS EVERY 24 HOURS
Refills: 0 | Status: DISCONTINUED | OUTPATIENT
Start: 2022-10-04 | End: 2022-10-05

## 2022-10-04 RX ORDER — INFLUENZA VIRUS VACCINE 15; 15; 15; 15 UG/.5ML; UG/.5ML; UG/.5ML; UG/.5ML
0.5 SUSPENSION INTRAMUSCULAR ONCE
Refills: 0 | Status: COMPLETED | OUTPATIENT
Start: 2022-10-04 | End: 2022-10-05

## 2022-10-04 RX ADMIN — Medication 12.5 MILLIGRAM(S): at 06:36

## 2022-10-04 RX ADMIN — Medication 1 GRAM(S): at 19:40

## 2022-10-04 RX ADMIN — Medication 1 GRAM(S): at 06:36

## 2022-10-04 RX ADMIN — ATORVASTATIN CALCIUM 10 MILLIGRAM(S): 80 TABLET, FILM COATED ORAL at 23:15

## 2022-10-04 RX ADMIN — PANTOPRAZOLE SODIUM 40 MILLIGRAM(S): 20 TABLET, DELAYED RELEASE ORAL at 06:36

## 2022-10-04 RX ADMIN — VALSARTAN 80 MILLIGRAM(S): 80 TABLET ORAL at 06:36

## 2022-10-04 NOTE — CONSULT NOTE ADULT - ATTENDING COMMENTS
Patient seen and examined with the GI fellow. I agree with the above assessment and plan. Thank you for allowing us to care for your patient.    Plan for EUS today for evaluation of dilated bile ducts in the setting of extreme weight loss.

## 2022-10-04 NOTE — CONSULT NOTE ADULT - SUBJECTIVE AND OBJECTIVE BOX
Chief Complaint:  Patient is a 56y old  Female who presents with a chief complaint of Abdominal pain (03 Oct 2022 23:41)      HPI:  Ms. Blackman is a 55yo F with PMHx of hyperlipidemia, GERD, hypertension, umbilical hernia, and gout presents with abdominal pain.    Patient reports chronic abdominal pain, epigastric, worse before meals, and associated with cramping, improves with BMs. Follows with GI Rajendra Esteban. No fever, chills, weight loss, constipation or diarrhea. No melena or hematochezia.     Patient was previously seen in the ED for abdominal pain in [facility] on 9/26/2022, was evaluated with CT scan, which showed 1.4 cm pancreatic cystic lesion and additional subcentimeter hypodensity, and was subsequently discharged without admission. She also underwent ultrasound of the abdomen, which confirmed a ventral hernia and a "1.5 cm pancreatic cystic lesion, likely representing a small side-branch IPMN without evidence of worrisome features", as well as an MRI abdomen, which showed mildly dilated CBD w/o intrahepatic biliary dilation s/p cholecystectomy but did not visualize the pancreatic cyst. Patient reports she previously underwent an EGD and colonoscopy 4 months ago, only notable for a "gastric ulcer". Patient was told by outpatient GI to come to ED for endoscopic "biopsies".     In the ED: VSS    Allergies:  No Known Allergies      Home Medications:  atorvastatin 10 mg oral tablet: 1 tab(s) orally once a day (04 Oct 2022 01:06)  pantoprazole 40 mg oral delayed release tablet: 1 tab(s) orally once a day (04 Oct 2022 01:06)  sucralfate 1 g oral tablet: 1 tab(s) orally 2 times a day (04 Oct 2022 01:06)  valsartan-hydrochlorothiazide 80 mg-12.5 mg oral tablet: 1 tab(s) orally once a day (04 Oct 2022 01:06)    Hospital Medications:  acetaminophen     Tablet .. 650 milliGRAM(s) Oral every 6 hours PRN  atorvastatin 10 milliGRAM(s) Oral at bedtime  enoxaparin Injectable 40 milliGRAM(s) SubCutaneous every 24 hours  hydrochlorothiazide 12.5 milliGRAM(s) Oral daily  pantoprazole    Tablet 40 milliGRAM(s) Oral before breakfast  sucralfate 1 Gram(s) Oral two times a day  valsartan 80 milliGRAM(s) Oral daily      PMHX/PSHX:  HTN (hypertension)    HLD (hyperlipidemia)    GERD (gastroesophageal reflux disease)    Umbilical hernia    H/O varicose veins    Gout    S/P cholecystectomy    S/P hysterectomy    Status post laser cataract surgery of left eye    S/P sclerotherapy of varicose veins        Family history:  FHx: hepatic cirrhosis (Father)    FH: hypertension (Mother)    FHx: stroke (Mother)        Denies family history of colon cancer/polyps, stomach cancer/polyps, pancreatic cancer/masses, liver cancer/disease, ovarian cancer and endometrial cancer.    Social History:     Tob: Denies  EtOH: As above  Illicit Drugs: Denies    ROS:   General:  No wt loss, fevers, chills, night sweats, fatigue  Eyes:  Good vision, no reported pain  ENT:  No sore throat, pain, runny nose, dysphagia  CV:  No pain, palpitations, hypo/hypertension  Pulm:  No dyspnea, cough, tachypnea, wheezing  GI:  As per HPI  :  No pain, bleeding, incontinence, nocturia  Muscle:  No pain, weakness  Neuro:  No weakness, tingling, memory problems  Psych:  No fatigue, insomnia, mood problems, depression  Endocrine:  No polyuria, polydipsia, cold/heat intolerance  Heme:  No petechiae, ecchymosis, easy bruisability  Skin:  No rash, tattoos, scars, edema    PHYSICAL EXAM:   GENERAL:  No acute distress  HEENT:  Normocephalic/atraumatic, no scleral icterus  CHEST:  No accessory muscle use  HEART:  Regular rate and rhythm  ABDOMEN:  Soft, non-tender, non-distended  EXTREMITIES: No cyanosis, clubbing, or edema  SKIN:  No rash  NEURO:  Alert and oriented x 3, no asterixis    Vital Signs:  Vital Signs Last 24 Hrs  T(C): 36.7 (04 Oct 2022 06:00), Max: 37 (03 Oct 2022 23:53)  T(F): 98 (04 Oct 2022 06:00), Max: 98.6 (03 Oct 2022 23:53)  HR: 68 (04 Oct 2022 06:00) (66 - 70)  BP: 132/60 (04 Oct 2022 06:00) (131/91 - 154/72)  BP(mean): --  RR: 18 (04 Oct 2022 06:00) (16 - 18)  SpO2: 100% (04 Oct 2022 06:00) (100% - 100%)    Parameters below as of 04 Oct 2022 06:00  Patient On (Oxygen Delivery Method): room air      Daily     Daily     LABS:                        11.2   4.82  )-----------( 197      ( 04 Oct 2022 05:40 )             35.0     Mean Cell Volume: 88.2 fL (10-04-22 @ 05:40)    10-04    146<H>  |  109<H>  |  12  ----------------------------<  88  3.5   |  25  |  0.86    Ca    8.8      04 Oct 2022 05:40  Phos  3.1     10-04  Mg     2.10     10-04    TPro  7.5  /  Alb  4.9  /  TBili  0.4  /  DBili  x   /  AST  19  /  ALT  15  /  AlkPhos  72  10-03    LIVER FUNCTIONS - ( 03 Oct 2022 18:10 )  Alb: 4.9 g/dL / Pro: 7.5 g/dL / ALK PHOS: 72 U/L / ALT: 15 U/L / AST: 19 U/L / GGT: x               Amylase Serum--      Lipase serum28       Ammonia--                          11.2   4.82  )-----------( 197      ( 04 Oct 2022 05:40 )             35.0                         12.6   5.19  )-----------( 225      ( 03 Oct 2022 18:10 )             40.7       Imaging:           Chief Complaint:  Patient is a 56y old  Female who presents with a chief complaint of Abdominal pain (03 Oct 2022 23:41)      HPI:  Ms. Blackman is a 55yo F with PMHx of hyperlipidemia, GERD, hypertension, umbilical hernia, and gout presents with abdominal pain.    Patient reports chronic abdominal pain, epigastric, worse before meals, and associated with cramping, improves with BMs. Follows with GI Rajendra Esteban. No fever, chills, constipation or diarrhea. No melena or hematochezia. Reports 9lbs weight loss in the past 4 months.    Patient was previously seen in the ED for abdominal pain in [facility] on 9/26/2022, was evaluated with CT scan, which showed 1.4 cm pancreatic cystic lesion and additional subcentimeter hypodensity, and was subsequently discharged without admission. She also underwent ultrasound of the abdomen, which confirmed a ventral hernia and a "1.5 cm pancreatic cystic lesion, likely representing a small side-branch IPMN without evidence of worrisome features", as well as an MRI abdomen, which showed mildly dilated CBD w/o intrahepatic biliary dilation s/p cholecystectomy but did not visualize the pancreatic cyst. Patient reports she previously underwent an EGD and colonoscopy 4 months ago, only notable for a "gastric ulcer". Patient was told by outpatient GI to come to ED for endoscopic "biopsies".     In the ED: VSS    Allergies:  No Known Allergies      Home Medications:  atorvastatin 10 mg oral tablet: 1 tab(s) orally once a day (04 Oct 2022 01:06)  pantoprazole 40 mg oral delayed release tablet: 1 tab(s) orally once a day (04 Oct 2022 01:06)  sucralfate 1 g oral tablet: 1 tab(s) orally 2 times a day (04 Oct 2022 01:06)  valsartan-hydrochlorothiazide 80 mg-12.5 mg oral tablet: 1 tab(s) orally once a day (04 Oct 2022 01:06)    Hospital Medications:  acetaminophen     Tablet .. 650 milliGRAM(s) Oral every 6 hours PRN  atorvastatin 10 milliGRAM(s) Oral at bedtime  enoxaparin Injectable 40 milliGRAM(s) SubCutaneous every 24 hours  hydrochlorothiazide 12.5 milliGRAM(s) Oral daily  pantoprazole    Tablet 40 milliGRAM(s) Oral before breakfast  sucralfate 1 Gram(s) Oral two times a day  valsartan 80 milliGRAM(s) Oral daily      PMHX/PSHX:  HTN (hypertension)    HLD (hyperlipidemia)    GERD (gastroesophageal reflux disease)    Umbilical hernia    H/O varicose veins    Gout    S/P cholecystectomy    S/P hysterectomy    Status post laser cataract surgery of left eye    S/P sclerotherapy of varicose veins        Family history:  FHx: hepatic cirrhosis (Father)    FH: hypertension (Mother)    FHx: stroke (Mother)        Denies family history of colon cancer/polyps, stomach cancer/polyps, pancreatic cancer/masses, liver cancer/disease, ovarian cancer and endometrial cancer.    Social History:     Tob: Denies  EtOH: As above  Illicit Drugs: Denies    ROS:   General:  No wt loss, fevers, chills, night sweats, fatigue  Eyes:  Good vision, no reported pain  ENT:  No sore throat, pain, runny nose, dysphagia  CV:  No pain, palpitations, hypo/hypertension  Pulm:  No dyspnea, cough, tachypnea, wheezing  GI:  As per HPI  :  No pain, bleeding, incontinence, nocturia  Muscle:  No pain, weakness  Neuro:  No weakness, tingling, memory problems  Psych:  No fatigue, insomnia, mood problems, depression  Endocrine:  No polyuria, polydipsia, cold/heat intolerance  Heme:  No petechiae, ecchymosis, easy bruisability  Skin:  No rash, tattoos, scars, edema    PHYSICAL EXAM:   GENERAL:  No acute distress  HEENT:  Normocephalic/atraumatic, no scleral icterus  CHEST:  No accessory muscle use  HEART:  Regular rate and rhythm  ABDOMEN:  Soft, non-tender, non-distended  EXTREMITIES: No cyanosis, clubbing, or edema  SKIN:  No rash  NEURO:  Alert and oriented x 3, no asterixis    Vital Signs:  Vital Signs Last 24 Hrs  T(C): 36.7 (04 Oct 2022 06:00), Max: 37 (03 Oct 2022 23:53)  T(F): 98 (04 Oct 2022 06:00), Max: 98.6 (03 Oct 2022 23:53)  HR: 68 (04 Oct 2022 06:00) (66 - 70)  BP: 132/60 (04 Oct 2022 06:00) (131/91 - 154/72)  BP(mean): --  RR: 18 (04 Oct 2022 06:00) (16 - 18)  SpO2: 100% (04 Oct 2022 06:00) (100% - 100%)    Parameters below as of 04 Oct 2022 06:00  Patient On (Oxygen Delivery Method): room air      Daily     Daily     LABS:                        11.2   4.82  )-----------( 197      ( 04 Oct 2022 05:40 )             35.0     Mean Cell Volume: 88.2 fL (10-04-22 @ 05:40)    10-04    146<H>  |  109<H>  |  12  ----------------------------<  88  3.5   |  25  |  0.86    Ca    8.8      04 Oct 2022 05:40  Phos  3.1     10-04  Mg     2.10     10-04    TPro  7.5  /  Alb  4.9  /  TBili  0.4  /  DBili  x   /  AST  19  /  ALT  15  /  AlkPhos  72  10-03    LIVER FUNCTIONS - ( 03 Oct 2022 18:10 )  Alb: 4.9 g/dL / Pro: 7.5 g/dL / ALK PHOS: 72 U/L / ALT: 15 U/L / AST: 19 U/L / GGT: x               Amylase Serum--      Lipase serum28       Ammonia--                          11.2   4.82  )-----------( 197      ( 04 Oct 2022 05:40 )             35.0                         12.6   5.19  )-----------( 225      ( 03 Oct 2022 18:10 )             40.7       Imaging:

## 2022-10-04 NOTE — PHYSICAL THERAPY INITIAL EVALUATION ADULT - NSPTDISCHREC_GEN_A_CORE
Pt. not placed on skilled therapy services secondary to pt. performing at their baseline functional mobility performance. anticipate discharge to home with no skilled PT services./No skilled PT needs Pt. not placed on skilled therapy services secondary to pt. performing at their baseline functional mobility performance. anticipate discharge to home with no skilled PT services./Outpatient PT

## 2022-10-04 NOTE — CONSULT NOTE ADULT - ASSESSMENT
57yo F with PMHx of hyperlipidemia, GERD, hypertension, umbilical hernia, and gout presents with abdominal pain.    # Abdominal pain - Chronic, unclear if related to previously seen gastric ulcer vs.  57yo F with PMHx of hyperlipidemia, GERD, hypertension, umbilical hernia, and gout presents with abdominal pain.    # Dilated CBD - with associated weight loss. Will plan for EUS as requested by Dr. Thapa.  # Abdominal pain - Chronic, unclear if related to previously seen gastric ulcer vs. functional pain vs. IBS vs. biliary (although not characteristic pain features).    Recommendations:  - EUS+/- ERCP  - NPO    Thank you for involving us in the care of this patient. Please reach out if any further questions.    Jhonny Henderson, PGY-6  Gastroenterology/Hepatology Fellow    Available on [employer] Teams  After 5PM/Weekends, please contact the on-call GI fellow: [phone]

## 2022-10-04 NOTE — PHYSICAL THERAPY INITIAL EVALUATION ADULT - ADDITIONAL COMMENTS
Pt lives in a private house with her , +6-7 steps to negotiate, was independent with all ADLs and functional mobility prior to admission without using an assistive device.     Pt left semi-supine in bed, all lines intact, all needs in reach, comfortable and in NAD.

## 2022-10-04 NOTE — PHYSICAL THERAPY INITIAL EVALUATION ADULT - WEIGHT-BEARING RESTRICTIONS: STAND/SIT, REHAB EVAL
TRANSFER - OUT REPORT:    Verbal report given to Anthony Bartlett RN(name) on Rosibel Dorman  being transferred to [place] [id number] for routine post - op       Report consisted of patients Situation, Background, Assessment and   Recommendations(SBAR). Information from the following report(s) SBAR, Kardex, OR Summary, Intake/Output, MAR and Cardiac Rhythm NSR was reviewed with the receiving nurse. Lines:   Peripheral IV 01/19/17 Left Forearm (Active)   Site Assessment Clean, dry, & intact 1/19/2017  4:20 PM   Phlebitis Assessment 0 1/19/2017  4:20 PM   Infiltration Assessment 0 1/19/2017  4:20 PM   Dressing Status Clean, dry, & intact 1/19/2017  4:20 PM   Dressing Type Tape;Transparent 1/19/2017  4:20 PM   Hub Color/Line Status Pink; Infusing 1/19/2017  4:20 PM        Opportunity for questions and clarification was provided.       Patient transported with:   Monitor  O2 @ 2 liters  Registered Nurse full weight-bearing

## 2022-10-04 NOTE — PHYSICAL THERAPY INITIAL EVALUATION ADULT - PERTINENT HX OF CURRENT PROBLEM, REHAB EVAL
56 year old Female with PMHx of hyperlipidemia, GERD, hypertension, umbilical hernia, and gout presents with persistent abdominal pain.

## 2022-10-05 ENCOUNTER — TRANSCRIPTION ENCOUNTER (OUTPATIENT)
Age: 57
End: 2022-10-05

## 2022-10-05 VITALS
SYSTOLIC BLOOD PRESSURE: 146 MMHG | DIASTOLIC BLOOD PRESSURE: 53 MMHG | OXYGEN SATURATION: 100 % | RESPIRATION RATE: 18 BRPM | TEMPERATURE: 98 F | HEART RATE: 56 BPM

## 2022-10-05 DIAGNOSIS — E87.6 HYPOKALEMIA: ICD-10-CM

## 2022-10-05 LAB
ANION GAP SERPL CALC-SCNC: 11 MMOL/L — SIGNIFICANT CHANGE UP (ref 7–14)
ANION GAP SERPL CALC-SCNC: 14 MMOL/L — SIGNIFICANT CHANGE UP (ref 7–14)
BILIRUB SERPL-MCNC: 0.8 MG/DL — SIGNIFICANT CHANGE UP (ref 0.2–1.2)
BUN SERPL-MCNC: 10 MG/DL — SIGNIFICANT CHANGE UP (ref 7–23)
BUN SERPL-MCNC: 10 MG/DL — SIGNIFICANT CHANGE UP (ref 7–23)
CALCIUM SERPL-MCNC: 9.2 MG/DL — SIGNIFICANT CHANGE UP (ref 8.4–10.5)
CALCIUM SERPL-MCNC: 9.5 MG/DL — SIGNIFICANT CHANGE UP (ref 8.4–10.5)
CHLORIDE SERPL-SCNC: 103 MMOL/L — SIGNIFICANT CHANGE UP (ref 98–107)
CHLORIDE SERPL-SCNC: 104 MMOL/L — SIGNIFICANT CHANGE UP (ref 98–107)
CO2 SERPL-SCNC: 25 MMOL/L — SIGNIFICANT CHANGE UP (ref 22–31)
CO2 SERPL-SCNC: 26 MMOL/L — SIGNIFICANT CHANGE UP (ref 22–31)
CREAT SERPL-MCNC: 0.84 MG/DL — SIGNIFICANT CHANGE UP (ref 0.5–1.3)
CREAT SERPL-MCNC: 0.86 MG/DL — SIGNIFICANT CHANGE UP (ref 0.5–1.3)
EGFR: 79 ML/MIN/1.73M2 — SIGNIFICANT CHANGE UP
EGFR: 81 ML/MIN/1.73M2 — SIGNIFICANT CHANGE UP
GLUCOSE SERPL-MCNC: 85 MG/DL — SIGNIFICANT CHANGE UP (ref 70–99)
GLUCOSE SERPL-MCNC: 90 MG/DL — SIGNIFICANT CHANGE UP (ref 70–99)
HCT VFR BLD CALC: 36.6 % — SIGNIFICANT CHANGE UP (ref 34.5–45)
HGB BLD-MCNC: 11.9 G/DL — SIGNIFICANT CHANGE UP (ref 11.5–15.5)
MAGNESIUM SERPL-MCNC: 2 MG/DL — SIGNIFICANT CHANGE UP (ref 1.6–2.6)
MCHC RBC-ENTMCNC: 28.5 PG — SIGNIFICANT CHANGE UP (ref 27–34)
MCHC RBC-ENTMCNC: 32.5 GM/DL — SIGNIFICANT CHANGE UP (ref 32–36)
MCV RBC AUTO: 87.8 FL — SIGNIFICANT CHANGE UP (ref 80–100)
NRBC # BLD: 0 /100 WBCS — SIGNIFICANT CHANGE UP (ref 0–0)
NRBC # FLD: 0 K/UL — SIGNIFICANT CHANGE UP (ref 0–0)
PHOSPHATE SERPL-MCNC: 3.5 MG/DL — SIGNIFICANT CHANGE UP (ref 2.5–4.5)
PLATELET # BLD AUTO: 191 K/UL — SIGNIFICANT CHANGE UP (ref 150–400)
POTASSIUM SERPL-MCNC: 3 MMOL/L — LOW (ref 3.5–5.3)
POTASSIUM SERPL-MCNC: 3.7 MMOL/L — SIGNIFICANT CHANGE UP (ref 3.5–5.3)
POTASSIUM SERPL-SCNC: 3 MMOL/L — LOW (ref 3.5–5.3)
POTASSIUM SERPL-SCNC: 3.7 MMOL/L — SIGNIFICANT CHANGE UP (ref 3.5–5.3)
RBC # BLD: 4.17 M/UL — SIGNIFICANT CHANGE UP (ref 3.8–5.2)
RBC # FLD: 12.4 % — SIGNIFICANT CHANGE UP (ref 10.3–14.5)
SODIUM SERPL-SCNC: 141 MMOL/L — SIGNIFICANT CHANGE UP (ref 135–145)
SODIUM SERPL-SCNC: 142 MMOL/L — SIGNIFICANT CHANGE UP (ref 135–145)
SURGICAL PATHOLOGY STUDY: SIGNIFICANT CHANGE UP
WBC # BLD: 4.54 K/UL — SIGNIFICANT CHANGE UP (ref 3.8–10.5)
WBC # FLD AUTO: 4.54 K/UL — SIGNIFICANT CHANGE UP (ref 3.8–10.5)

## 2022-10-05 PROCEDURE — 99232 SBSQ HOSP IP/OBS MODERATE 35: CPT | Mod: GC

## 2022-10-05 PROCEDURE — 99239 HOSP IP/OBS DSCHRG MGMT >30: CPT

## 2022-10-05 RX ORDER — POTASSIUM CHLORIDE 20 MEQ
10 PACKET (EA) ORAL
Refills: 0 | Status: COMPLETED | OUTPATIENT
Start: 2022-10-05 | End: 2022-10-05

## 2022-10-05 RX ORDER — POTASSIUM CHLORIDE 20 MEQ
40 PACKET (EA) ORAL ONCE
Refills: 0 | Status: COMPLETED | OUTPATIENT
Start: 2022-10-05 | End: 2022-10-05

## 2022-10-05 RX ORDER — ACETAMINOPHEN 500 MG
2 TABLET ORAL
Qty: 0 | Refills: 0 | DISCHARGE
Start: 2022-10-05

## 2022-10-05 RX ADMIN — Medication 100 MILLIEQUIVALENT(S): at 10:28

## 2022-10-05 RX ADMIN — Medication 100 MILLIEQUIVALENT(S): at 11:46

## 2022-10-05 RX ADMIN — Medication 12.5 MILLIGRAM(S): at 05:41

## 2022-10-05 RX ADMIN — Medication 40 MILLIEQUIVALENT(S): at 11:45

## 2022-10-05 RX ADMIN — Medication 1 GRAM(S): at 05:41

## 2022-10-05 RX ADMIN — INFLUENZA VIRUS VACCINE 0.5 MILLILITER(S): 15; 15; 15; 15 SUSPENSION INTRAMUSCULAR at 16:42

## 2022-10-05 RX ADMIN — Medication 100 MILLIEQUIVALENT(S): at 12:52

## 2022-10-05 RX ADMIN — VALSARTAN 80 MILLIGRAM(S): 80 TABLET ORAL at 05:42

## 2022-10-05 RX ADMIN — ENOXAPARIN SODIUM 40 MILLIGRAM(S): 100 INJECTION SUBCUTANEOUS at 05:52

## 2022-10-05 RX ADMIN — PANTOPRAZOLE SODIUM 40 MILLIGRAM(S): 20 TABLET, DELAYED RELEASE ORAL at 05:41

## 2022-10-05 NOTE — DISCHARGE NOTE PROVIDER - NSDCMRMEDTOKEN_GEN_ALL_CORE_FT
atorvastatin 10 mg oral tablet: 1 tab(s) orally once a day  pantoprazole 40 mg oral delayed release tablet: 1 tab(s) orally once a day  sucralfate 1 g oral tablet: 1 tab(s) orally 2 times a day  valsartan-hydrochlorothiazide 80 mg-12.5 mg oral tablet: 1 tab(s) orally once a day   acetaminophen 325 mg oral tablet: 2 tab(s) orally every 6 hours, As needed, Temp greater or equal to 38C (100.4F), Mild Pain (1 - 3)  atorvastatin 10 mg oral tablet: 1 tab(s) orally once a day  pantoprazole 40 mg oral delayed release tablet: 1 tab(s) orally once a day  sucralfate 1 g oral tablet: 1 tab(s) orally 2 times a day  valsartan-hydrochlorothiazide 80 mg-12.5 mg oral tablet: 1 tab(s) orally once a day

## 2022-10-05 NOTE — PROGRESS NOTE ADULT - PROBLEM SELECTOR PLAN 7
DVT: Lovenox   Diet: NPO except meds   Dispo: Home with PCP and GI f/u in 1 week, likely today if pt tolerates PO and hypokalemia corrected.

## 2022-10-05 NOTE — PROGRESS NOTE ADULT - ATTENDING COMMENTS
56 year old woman with hx of HLD, GERD, HTN presents with abdominal pain. GI consulted for dilated CBD. Patient s/p FNA of pancreatic body cyst. No GI contraindication to discharge. Follow up with Dr. Thapa

## 2022-10-05 NOTE — PROGRESS NOTE ADULT - PROBLEM SELECTOR PLAN 6
- C/w patient's home atorvastatin
DVT: Lovenox   Diet: NPO except meds   Dispo: pending clinical progress

## 2022-10-05 NOTE — PROGRESS NOTE ADULT - PROBLEM SELECTOR PLAN 5
Patient's sBP 150s in ED   - Restart patient's home Valsartan /HCTZ
- C/w patient's home atorvastatin

## 2022-10-05 NOTE — DISCHARGE NOTE PROVIDER - HOSPITAL COURSE
Assessment and Plan:   · Assessment    55 yo F with PMHx of hyperlipidemia, GERD, hypertension, umbilical hernia, and gout presents with persistent abdominal pain.      Problem/Plan - 1:  ·  Problem: Abdominal pain.   ·  Plan: Pancreatic cystic lesion vs. gastritis/peptic ulcer disease   Less concern for pancreatitis and bowel obstruction due to low lipase and ability to tolerate soft foods without vomiting , s/p EUS, tolerated well, tolerated liquid diet.   - Advance diet to regular HART diet.    - Tylenol PRN for pain  - continue PPI  - F/U GI consult rec.  -Likely d/c later today if pt tolerated PO and hypokalemia corrected.     Problem/Plan - 2:  ·  Problem: Hypokalemia.   ·  Plan: Likely due to NPO and Diuretics  -Replete Potassium and repeat BMP 1PM.     Problem/Plan - 3:  ·  Problem: Pancreatic cyst.   ·  Plan: CT abdomen shows 1.4 cm pancreatic cystic lesion and additional subcentimeter hypodensity   - GI consult, s/p EUS  - F/U with GI in 1 week for Biopsy results.     Problem/Plan - 4:  ·  Problem: GERD (gastroesophageal reflux disease).   ·  Plan: - Restart patient's home pantoprazole, Carafate.     Problem/Plan - 5:  ·  Problem: Hypertension.   ·  Plan: Patient's sBP 150s in ED   - Restart patient's home Valsartan /HCTZ.     Problem/Plan - 6:  ·  Problem: Hyperlipidemia.   ·  Plan: - C/w patient's home atorvastatin.     Problem/Plan - 7:  ·  Problem: Need for prophylactic measure.   ·  Plan: DVT: Lovenox   Diet: NPO except meds   Dispo: Home with PCP and GI f/u in 1 week, likely today if pt tolerates PO and hypokalemia corrected.    On ___ this case was reviewed with  ____, the patient is medically stable and optimized for discharge. All medications were reviewed and prescriptions were sent to mutually agreed upon pharmacy.       Assessment and Plan:   · Assessment    57 yo F with PMHx of hyperlipidemia, GERD, hypertension, umbilical hernia, and gout presents with persistent abdominal pain.      Problem/Plan - 1:  ·  Problem: Abdominal pain.   ·  Plan: Pancreatic cystic lesion vs. gastritis/peptic ulcer disease   Less concern for pancreatitis and bowel obstruction due to low lipase and ability to tolerate soft foods without vomiting , s/p EUS, tolerated well, tolerated liquid diet.   - Advance diet to regular HART diet.    - Tylenol PRN for pain  - continue PPI  - F/U GI consult rec.  -Likely d/c later today if pt tolerated PO and hypokalemia corrected.     Problem/Plan - 2:  ·  Problem: Hypokalemia.   ·  Plan: Likely due to NPO and Diuretics  -Replete Potassium and repeat BMP 1PM.     Problem/Plan - 3:  ·  Problem: Pancreatic cyst.   ·  Plan: CT abdomen shows 1.4 cm pancreatic cystic lesion and additional subcentimeter hypodensity   - GI consult, s/p EUS  - F/U with GI in 1 week for Biopsy results.     Problem/Plan - 4:  ·  Problem: GERD (gastroesophageal reflux disease).   ·  Plan: - Restart patient's home pantoprazole, Carafate.     Problem/Plan - 5:  ·  Problem: Hypertension.   ·  Plan: Patient's sBP 150s in ED   - Restart patient's home Valsartan /HCTZ.     Problem/Plan - 6:  ·  Problem: Hyperlipidemia.   ·  Plan: - C/w patient's home atorvastatin.     Problem/Plan - 7:  ·  Problem: Need for prophylactic measure.   ·  Plan: DVT: Lovenox   Diet: NPO except meds   Dispo: Home with PCP and GI f/u in 1 week, likely today if pt tolerates PO and hypokalemia corrected.    On 10/ 5 this case was reviewed with Josh Latham patient is medically stable and optimized for discharge. All medications were reviewed and patient stated  she does not need to send  any medications to pharmacy

## 2022-10-05 NOTE — PROGRESS NOTE ADULT - PROBLEM SELECTOR PLAN 2
Likely due to NPO and Diuretics  -Replete Potassium and repeat BMP 1PM
CT abdomen shows 1.4 cm pancreatic cystic lesion and additional subcentimeter hypodensity   - GI consult, s/p EUS  - F/U GI rec

## 2022-10-05 NOTE — PROGRESS NOTE ADULT - NSPROGADDITIONALINFOA_GEN_ALL_CORE
D/w ACP
Spoke to pt at length about discharge. She wished to go home today. F/U issues discussed with pt, she understood and agreed with the plan.  D/W ACP.  Total time: 35min

## 2022-10-05 NOTE — PROGRESS NOTE ADULT - ASSESSMENT
55yo F with PMHx of hyperlipidemia, GERD, hypertension, umbilical hernia, and gout presents with abdominal pain.    # Dilated CBD - with associated weight loss. Will plan for EUS as requested by Dr. Thapa. EUS with cyst, s/p FNA.   # Abdominal pain - Chronic, unclear if related to previously seen gastric ulcer vs. functional pain vs. IBS vs. biliary (although not characteristic pain features).    Recommendations:  - No objections to discharge and outpatient followup with Dr. Thapa  - f/u cytology results    Thank you for involving us in the care of this patient. Please reach out if any further questions.    Jhonny Henderson, PGY-6  Gastroenterology/Hepatology Fellow    Available on [employer] Teams  After 5PM/Weekends, please contact the on-call GI fellow: [phone]  
57 yo F with PMHx of hyperlipidemia, GERD, hypertension, umbilical hernia, and gout presents with persistent abdominal pain. 
57 yo F with PMHx of hyperlipidemia, GERD, hypertension, umbilical hernia, and gout presents with persistent abdominal pain.

## 2022-10-05 NOTE — PROGRESS NOTE ADULT - PROBLEM SELECTOR PLAN 3
CT abdomen shows 1.4 cm pancreatic cystic lesion and additional subcentimeter hypodensity   - GI consult, s/p EUS  - F/U with GI in 1 week for Biopsy results.
- Restart patient's home pantoprazole

## 2022-10-05 NOTE — DISCHARGE NOTE PROVIDER - NSDCCPCAREPLAN_GEN_ALL_CORE_FT
PRINCIPAL DISCHARGE DIAGNOSIS  Diagnosis: Abdominal pain  Assessment and Plan of Treatment: You were admitted with abdominal painand also you were treated for nausea and vomiting related to stones located in common bile duct and causing inflammation of your pancreas. You were placed on IV fluids and pina medications and after your liver enzymes and lipase numbers were decreasing and your abddominal pain was under control, you were changed from clear liquid diet to low fat diet that you tolerated wwithout vomiting and nausea. In case of pain take Tylenol 650 mg every 6 hours as needed .  You are medically cleared for discharge and to follow up with doctor Thapa in 11 week, call for appointment . . Return to Emergency Room if any abdominal pain , nausea vomiting , diarrhea constipation or blood in stool. Folow up also with Shiva Shea regarding repeat ERCP in 2-3 months for billary stent removal that were placed during ERCP procedure done in the hospital and follow up with Gastroenterology .      SECONDARY DISCHARGE DIAGNOSES  Diagnosis: Hypokalemia  Assessment and Plan of Treatment: You had low level of potassium , most likely due to no earting status and medications getting rids water from your body . We were giving you potassium intravenously and tablet and we rechecked potassium level that us normal level now .Please follow-up as an outpatient with your primary care physician in one week regarding your hospitalization and for further care and recommendations,    Diagnosis: Pancreatic cyst  Assessment and Plan of Treatment: Your abdominal CT showed 1.4 cm pancreatic cystic lesion and additional subcentimeter hypodensity , gastroenterology consult were done , test EUS was done , please follow up with dr. Thapa in 1 week for biopsy results.       Diagnosis: GERD (gastroesophageal reflux disease)  Assessment and Plan of Treatment: Take your pantoprazole and carafate as ordered, follow up with your primary doctor.    Diagnosis: Hypertension  Assessment and Plan of Treatment: Continue blood pressure medication Valsartan- HCTZ as directed. Monitor for any visual changes, headaches or dizziness.  Monitor blood pressure regularly.  Follow up with your primary doctor  for further management for high blood pressure.      Diagnosis: HLD (hyperlipidemia)  Assessment and Plan of Treatment: Continue cholesterol control medications Atorvastatin. Continue DASH diet. Follow up with your primary doctor within 1 week of discharge for further management and monitoring of lipid and cholesterol panels.       PRINCIPAL DISCHARGE DIAGNOSIS  Diagnosis: Abdominal pain  Assessment and Plan of Treatment: You were admitted with abdominal painand also you were treated for nausea and vomiting related to stones located in common bile duct and causing inflammation of your pancreas. You were placed on IV fluids and pina medications and after your liver enzymes and lipase numbers were decreasing and your abddominal pain was under control, you were changed from clear liquid diet to low fat diet that you tolerated wwithout vomiting and nausea. In case of pain take Tylenol 650 mg every 6 hours as needed .  You are medically cleared for discharge and to follow up with doctor Thapa in 11 week, call for appointment . . Return to Emergency Room if any abdominal pain , nausea vomiting , diarrhea constipation or blood in stool. Folow up also with Shiva Shea regarding repeat ERCP in 2-3 months for billary stent removal that were placed during ERCP procedure done in the hospital and follow up with Gastroenterology .      SECONDARY DISCHARGE DIAGNOSES  Diagnosis: Hypokalemia  Assessment and Plan of Treatment: You had low level of potassium , most likely due to no earting status and medications getting rids water from your body . We were giving you potassium intravenously and tablet and we rechecked potassium level that us normal level now- 3.7. .Please follow-up as an outpatient with your primary care physician in one week regarding your hospitalization and for further care and recommendations,    Diagnosis: Pancreatic cyst  Assessment and Plan of Treatment: Your abdominal CT showed 1.4 cm pancreatic cystic lesion and additional subcentimeter hypodensity , gastroenterology consult were done , test EUS was done , please follow up with dr. Thapa in 1 week for biopsy results.       Diagnosis: GERD (gastroesophageal reflux disease)  Assessment and Plan of Treatment: Take your pantoprazole and carafate as ordered, follow up with your primary doctor.    Diagnosis: Hypertension  Assessment and Plan of Treatment: Continue blood pressure medication Valsartan- HCTZ as directed. Monitor for any visual changes, headaches or dizziness.  Monitor blood pressure regularly.  Follow up with your primary doctor  for further management for high blood pressure.      Diagnosis: HLD (hyperlipidemia)  Assessment and Plan of Treatment: Continue cholesterol control medications Atorvastatin. Continue DASH diet. Follow up with your primary doctor within 1 week of discharge for further management and monitoring of lipid and cholesterol panels.       PRINCIPAL DISCHARGE DIAGNOSIS  Diagnosis: Abdominal pain  Assessment and Plan of Treatment: You were admitted with abdominal painand also you were treated for nausea and vomiting related to stones located in common bile duct and causing inflammation of your pancreas. You were placed on IV fluids and pina medications and after your liver enzymes and lipase numbers were decreasing and your abddominal pain was under control, you were changed from clear liquid diet to low fat diet that you tolerated wwithout vomiting and nausea. In case of pain take Tylenol 650 mg every 6 hours as needed .  You are medically cleared for discharge and to follow up with doctor Thapa in 1 week, call for appointment . . Return to Emergency Room if any abdominal pain , nausea vomiting , diarrhea constipation or blood in stool.      SECONDARY DISCHARGE DIAGNOSES  Diagnosis: Hypokalemia  Assessment and Plan of Treatment: You had low level of potassium , most likely due to no earting status and medications getting rids water from your body . We were giving you potassium intravenously and tablet and we rechecked potassium level that us normal level now- 3.7. .Please follow-up as an outpatient with your primary care physician in one week regarding your hospitalization and for further care and recommendations,    Diagnosis: Pancreatic cyst  Assessment and Plan of Treatment: Your abdominal CT showed 1.4 cm pancreatic cystic lesion and additional subcentimeter hypodensity , gastroenterology consult were done , test EUS was done , please follow up with dr. Thapa in 1 week for biopsy results.       Diagnosis: GERD (gastroesophageal reflux disease)  Assessment and Plan of Treatment: Take your pantoprazole and carafate as ordered, follow up with your primary doctor.    Diagnosis: Hypertension  Assessment and Plan of Treatment: Continue blood pressure medication Valsartan- HCTZ as directed. Monitor for any visual changes, headaches or dizziness.  Monitor blood pressure regularly.  Follow up with your primary doctor  for further management for high blood pressure.      Diagnosis: HLD (hyperlipidemia)  Assessment and Plan of Treatment: Continue cholesterol control medications Atorvastatin. Continue DASH diet. Follow up with your primary doctor within 1 week of discharge for further management and monitoring of lipid and cholesterol panels.

## 2022-10-05 NOTE — PROGRESS NOTE ADULT - SUBJECTIVE AND OBJECTIVE BOX
Interval Events:   - S/p EUS (see report)  - No acute events      Allergies:  No Known Allergies        Hospital Medications:  acetaminophen     Tablet .. 650 milliGRAM(s) Oral every 6 hours PRN  atorvastatin 10 milliGRAM(s) Oral at bedtime  enoxaparin Injectable 40 milliGRAM(s) SubCutaneous every 24 hours  hydrochlorothiazide 12.5 milliGRAM(s) Oral daily  influenza   Vaccine 0.5 milliLiter(s) IntraMuscular once  pantoprazole    Tablet 40 milliGRAM(s) Oral before breakfast  potassium chloride  10 mEq/100 mL IVPB 10 milliEquivalent(s) IV Intermittent every 1 hour  sucralfate 1 Gram(s) Oral two times a day  valsartan 80 milliGRAM(s) Oral daily      PMHX/PSHX:  HTN (hypertension)    HLD (hyperlipidemia)    GERD (gastroesophageal reflux disease)    Umbilical hernia    H/O varicose veins    Gout    S/P cholecystectomy    S/P hysterectomy    Status post laser cataract surgery of left eye    S/P sclerotherapy of varicose veins        Family history:  FHx: hepatic cirrhosis (Father)    FH: hypertension (Mother)    FHx: stroke (Mother)        ROS: As per HPI, otherwise 14-point ROS reviewed and negative.      PHYSICAL EXAM:   Vital Signs:  Vital Signs Last 24 Hrs  T(C): 36.8 (05 Oct 2022 05:51), Max: 37.1 (04 Oct 2022 21:50)  T(F): 98.2 (05 Oct 2022 05:51), Max: 98.8 (04 Oct 2022 21:50)  HR: 60 (05 Oct 2022 05:51) (53 - 67)  BP: 136/66 (05 Oct 2022 05:51) (98/35 - 150/69)  BP(mean): --  RR: 18 (05 Oct 2022 05:51) (14 - 18)  SpO2: 100% (05 Oct 2022 05:51) (96% - 100%)    Parameters below as of 05 Oct 2022 05:51  Patient On (Oxygen Delivery Method): room air      Daily Height in cm: 160.02 (04 Oct 2022 10:20)    Daily         GENERAL:  No acute distress  HEENT:  Normocephalic/atraumtic,  no scleral icterus  CHEST:  No accessory muscle use  HEART:  Regular rate and rhythm  ABDOMEN:  Soft, non-tender, non-distended  EXTREMITIES:  No cyanosis, clubbing, or edema  SKIN:  No rash  NEURO:  Alert and oriented x 3, no asterixis, no tremor    LABS:                        11.9   4.54  )-----------( 191      ( 05 Oct 2022 06:38 )             36.6     Mean Cell Volume: 87.8 fL (10-05-22 @ 06:38)    10-05    141  |  104  |  10  ----------------------------<  85  3.0<L>   |  26  |  0.86    Ca    9.2      05 Oct 2022 06:38  Phos  3.5     10-05  Mg     2.00     10-05    TPro  x   /  Alb  x   /  TBili  0.8  /  DBili  x   /  AST  x   /  ALT  x   /  AlkPhos  x   10-05    LIVER FUNCTIONS - ( 03 Oct 2022 18:10 )  Alb: 4.9 g/dL / Pro: 7.5 g/dL / ALK PHOS: 72 U/L / ALT: 15 U/L / AST: 19 U/L / GGT: x                                     11.9   4.54  )-----------( 191      ( 05 Oct 2022 06:38 )             36.6                         11.2   4.82  )-----------( 197      ( 04 Oct 2022 05:40 )             35.0                         12.6   5.19  )-----------( 225      ( 03 Oct 2022 18:10 )             40.7       Imaging:          
Patient is a 56y old  Female who presents with a chief complaint of Abdominal pain (05 Oct 2022 09:42)      SUBJECTIVE / OVERNIGHT EVENTS: Pt has no complaints today, pain better controlled.  Tolerated liquid diet.     MEDICATIONS  (STANDING):  atorvastatin 10 milliGRAM(s) Oral at bedtime  enoxaparin Injectable 40 milliGRAM(s) SubCutaneous every 24 hours  hydrochlorothiazide 12.5 milliGRAM(s) Oral daily  influenza   Vaccine 0.5 milliLiter(s) IntraMuscular once  pantoprazole    Tablet 40 milliGRAM(s) Oral before breakfast  potassium chloride  10 mEq/100 mL IVPB 10 milliEquivalent(s) IV Intermittent every 1 hour  sucralfate 1 Gram(s) Oral two times a day  valsartan 80 milliGRAM(s) Oral daily    MEDICATIONS  (PRN):  acetaminophen     Tablet .. 650 milliGRAM(s) Oral every 6 hours PRN Temp greater or equal to 38C (100.4F), Mild Pain (1 - 3)      Vital Signs Last 24 Hrs  T(C): 36.8 (05 Oct 2022 05:51), Max: 37.1 (04 Oct 2022 21:50)  T(F): 98.2 (05 Oct 2022 05:51), Max: 98.8 (04 Oct 2022 21:50)  HR: 60 (05 Oct 2022 05:51) (53 - 67)  BP: 136/66 (05 Oct 2022 05:51) (98/35 - 150/69)  BP(mean): --  RR: 18 (05 Oct 2022 05:51) (14 - 18)  SpO2: 100% (05 Oct 2022 05:51) (96% - 100%)    Parameters below as of 05 Oct 2022 05:51  Patient On (Oxygen Delivery Method): room air      CAPILLARY BLOOD GLUCOSE      POCT Blood Glucose.: 87 mg/dL (04 Oct 2022 22:19)    I&O's Summary      PHYSICAL EXAM:  GENERAL: NAD, well-developed  HEAD:  Atraumatic, Normocephalic  EYES: EOMI, PERRLA, conjunctiva and sclera clear  NECK: Supple, No JVD  CHEST/LUNG: Clear to auscultation bilaterally; No wheeze  HEART: Regular rate and rhythm; No murmurs, rubs, or gallops  ABDOMEN: Soft, minimally tender at RUQ, no guarding or rebound, Nondistended; Bowel sounds present  EXTREMITIES:  2+ Peripheral Pulses, No clubbing, cyanosis, or edema  PSYCH: AAOx3  NEUROLOGY: non-focal  SKIN: No rashes or lesions    LABS:                        11.9   4.54  )-----------( 191      ( 05 Oct 2022 06:38 )             36.6     10-05    141  |  104  |  10  ----------------------------<  85  3.0<L>   |  26  |  0.86    Ca    9.2      05 Oct 2022 06:38  Phos  3.5     10-05  Mg     2.00     10-05    TPro  x   /  Alb  x   /  TBili  0.8  /  DBili  x   /  AST  x   /  ALT  x   /  AlkPhos  x   10-05              RADIOLOGY & ADDITIONAL TESTS:    Imaging Personally Reviewed:    Consultant(s) Notes Reviewed:      Care Discussed with Consultants/Other Providers:
Patient is a 56y old  Female who presents with a chief complaint of Abdominal pain (04 Oct 2022 08:02)      SUBJECTIVE / OVERNIGHT EVENTS: Pt seen and examined, chart reviewed.. S/P EUS, no new complaints. Abd pain improving.     MEDICATIONS  (STANDING):  atorvastatin 10 milliGRAM(s) Oral at bedtime  enoxaparin Injectable 40 milliGRAM(s) SubCutaneous every 24 hours  hydrochlorothiazide 12.5 milliGRAM(s) Oral daily  pantoprazole    Tablet 40 milliGRAM(s) Oral before breakfast  sucralfate 1 Gram(s) Oral two times a day  valsartan 80 milliGRAM(s) Oral daily    MEDICATIONS  (PRN):  acetaminophen     Tablet .. 650 milliGRAM(s) Oral every 6 hours PRN Temp greater or equal to 38C (100.4F), Mild Pain (1 - 3)      Vital Signs Last 24 Hrs  T(C): 36.7 (04 Oct 2022 12:30), Max: 37 (03 Oct 2022 23:53)  T(F): 98 (04 Oct 2022 12:30), Max: 98.6 (03 Oct 2022 23:53)  HR: 53 (04 Oct 2022 13:00) (53 - 70)  BP: 122/59 (04 Oct 2022 13:00) (98/35 - 154/72)  BP(mean): --  RR: 14 (04 Oct 2022 13:00) (14 - 18)  SpO2: 100% (04 Oct 2022 13:00) (97% - 100%)    Parameters below as of 04 Oct 2022 13:00  Patient On (Oxygen Delivery Method): room air      CAPILLARY BLOOD GLUCOSE        I&O's Summary      PHYSICAL EXAM:  GENERAL: NAD, well-developed  HEAD:  Atraumatic, Normocephalic  EYES: EOMI, PERRLA, conjunctiva and sclera clear  NECK: Supple, No JVD  CHEST/LUNG: Clear to auscultation bilaterally; No wheeze  HEART: Regular rate and rhythm; No murmurs, rubs, or gallops  ABDOMEN: Soft, mildly tender at RUQ, no guarding or rebound, Nondistended; Bowel sounds present  EXTREMITIES:  2+ Peripheral Pulses, No clubbing, cyanosis, or edema  PSYCH: AAOx3  NEUROLOGY: non-focal  SKIN: No rashes or lesions    LABS:                        11.2   4.82  )-----------( 197      ( 04 Oct 2022 05:40 )             35.0     10-04    146<H>  |  109<H>  |  12  ----------------------------<  88  3.5   |  25  |  0.86    Ca    8.8      04 Oct 2022 05:40  Phos  3.1     10-04  Mg     2.10     10-04    TPro  7.5  /  Alb  4.9  /  TBili  0.4  /  DBili  x   /  AST  19  /  ALT  15  /  AlkPhos  72  10-03      < from: Upper EUS (10.04.22 @ 11:02) >  Impression:          - Erythematous mucosa in the stomach. Biopsied.                       - A 1.3 cm cyst was seen endosonographically in the               pancreatic body. Fine needle aspiration for fluid                        performed.  Recommendation:      - Advance diet as tolerated.                       - Await pathology results.                       - Return patient to hospital [facility] for ongoing care.                                                                                   Attending Participation:       I personally performed the entire procedure.            ___________________  FABIAN GATES MD  10/4/2022 12:09:05 PM  This report has been signed electronically.    ______________________  MARY GARCIA MD  Number of Addenda: 0    Note Initiated On: 10/4/2022 11:02 AM    < end of copied text >          RADIOLOGY & ADDITIONAL TESTS:    Imaging Personally Reviewed:    Consultant(s) Notes Reviewed:      Care Discussed with Consultants/Other Providers:

## 2022-10-05 NOTE — DISCHARGE NOTE PROVIDER - CARE PROVIDER_API CALL
Patient scheduled colonoscopy and EGD for GERD, epigastric pain, rectal bleeding and abdominal pain on 9/25/20 at 1145 with MAC. Prescription for Moviprep sent to pharmacy.    Rajendra Thapa (DO)  Medicine  [facility], [address]  [place] [address]  Phone: [phone]  Fax: [phone]  Follow Up Time: 1 week

## 2022-10-05 NOTE — DISCHARGE NOTE NURSING/CASE MANAGEMENT/SOCIAL WORK - PATIENT PORTAL LINK FT
You can access the FollowMyHealth Patient Portal offered by [facility] by registering at the following website: [email or web address]. By joining [employer]’s FollowMyHealth portal, you will also be able to view your health information using other applications (apps) compatible with our system.

## 2022-10-05 NOTE — DISCHARGE NOTE PROVIDER - NSDCQMERRANDS_GEN_ALL_CORE
No Quality 111:Pneumonia Vaccination Status For Older Adults: Pneumococcal Vaccination Previously Received Detail Level: Detailed Quality 110: Preventive Care And Screening: Influenza Immunization: Influenza Immunization Administered during Influenza season Quality 47: Advance Care Plan: Advance Care Planning discussed and documented; advance care plan or surrogate decision maker documented in the medical record.

## 2022-10-05 NOTE — DISCHARGE NOTE NURSING/CASE MANAGEMENT/SOCIAL WORK - NSDCPEFALRISK_GEN_ALL_CORE
For information on Fall & Injury Prevention, visit: [email or web address] OR  [email or web address] OR  https://www.cdc.gov/steadi/patient.html

## 2022-10-05 NOTE — PROGRESS NOTE ADULT - PROBLEM SELECTOR PLAN 1
Pancreatic cystic lesion vs. gastritis/peptic ulcer disease   Less concern for pancreatitis and bowel obstruction due to low lipase and ability to tolerate soft foods without vomiting , s/p EUS, tolerated well, tolerated liquid diet.   - Advance diet to regular HART diet.    - Tylenol PRN for pain  - continue PPI  - F/U GI consult rec.  -Likely d/c later today if pt tolerated PO and hypokalemia corrected.
Pancreatic cystic lesion vs. gastritis/peptic ulcer disease   Less concern for pancreatitis and bowel obstruction due to low lipase and ability to tolerate soft foods without vomiting , s/p EUS, tolerated well  - Keep NPO except medications, may start clear liquid diet if OK with GI    - Tylenol PRN for pain  - continue PPI  - F/U GI consult rec.

## 2022-10-05 NOTE — PROGRESS NOTE ADULT - PROBLEM SELECTOR PLAN 4
- Restart patient's home pantoprazole, Carafate
Patient's sBP 150s in ED   - Restart patient's home Valsartan HCTZ

## 2022-10-06 LAB
MELD SCORE WITH DIALYSIS: SIGNIFICANT CHANGE UP POINTS
MELD SCORE WITHOUT DIALYSIS: SIGNIFICANT CHANGE UP POINTS

## 2022-10-11 ENCOUNTER — NON-APPOINTMENT (OUTPATIENT)
Age: 57
End: 2022-10-11

## 2022-10-11 PROBLEM — Z00.00 ENCOUNTER FOR PREVENTIVE HEALTH EXAMINATION: Status: ACTIVE | Noted: 2022-10-11

## 2023-10-09 PROBLEM — K42.9 UMBILICAL HERNIA WITHOUT OBSTRUCTION OR GANGRENE: Chronic | Status: ACTIVE | Noted: 2022-10-03

## 2023-10-09 PROBLEM — M10.9 GOUT, UNSPECIFIED: Chronic | Status: ACTIVE | Noted: 2022-10-04

## 2023-10-09 PROBLEM — Z86.79 PERSONAL HISTORY OF OTHER DISEASES OF THE CIRCULATORY SYSTEM: Chronic | Status: ACTIVE | Noted: 2022-10-04

## 2024-01-10 ENCOUNTER — APPOINTMENT (OUTPATIENT)
Dept: GASTROENTEROLOGY | Facility: CLINIC | Age: 59
End: 2024-01-10

## 2024-03-22 ENCOUNTER — LABORATORY RESULT (OUTPATIENT)
Age: 59
End: 2024-03-22

## 2024-03-22 ENCOUNTER — APPOINTMENT (OUTPATIENT)
Dept: GASTROENTEROLOGY | Facility: CLINIC | Age: 59
End: 2024-03-22
Payer: MEDICARE

## 2024-03-22 VITALS
TEMPERATURE: 98 F | BODY MASS INDEX: 26.75 KG/M2 | DIASTOLIC BLOOD PRESSURE: 81 MMHG | WEIGHT: 151 LBS | HEIGHT: 63 IN | OXYGEN SATURATION: 100 % | HEART RATE: 65 BPM | SYSTOLIC BLOOD PRESSURE: 157 MMHG

## 2024-03-22 DIAGNOSIS — Z78.9 OTHER SPECIFIED HEALTH STATUS: ICD-10-CM

## 2024-03-22 DIAGNOSIS — Z86.79 PERSONAL HISTORY OF OTHER DISEASES OF THE CIRCULATORY SYSTEM: ICD-10-CM

## 2024-03-22 DIAGNOSIS — Z86.39 PERSONAL HISTORY OF OTHER ENDOCRINE, NUTRITIONAL AND METABOLIC DISEASE: ICD-10-CM

## 2024-03-22 PROCEDURE — 99204 OFFICE O/P NEW MOD 45 MIN: CPT

## 2024-03-22 RX ORDER — ATORVASTATIN CALCIUM 10 MG/1
10 TABLET, FILM COATED ORAL
Refills: 0 | Status: ACTIVE | COMMUNITY

## 2024-03-22 RX ORDER — ACETAMINOPHEN 500 MG
500 TABLET ORAL
Refills: 0 | Status: ACTIVE | COMMUNITY

## 2024-03-22 RX ORDER — MONTELUKAST 10 MG/1
10 TABLET, FILM COATED ORAL
Refills: 0 | Status: ACTIVE | COMMUNITY

## 2024-03-22 RX ORDER — DICLOFENAC SODIUM 10 MG/G
1 GEL TOPICAL
Refills: 0 | Status: ACTIVE | COMMUNITY

## 2024-03-22 RX ORDER — VALSARTAN AND HYDROCHLOROTHIAZIDE 80; 12.5 MG/1; MG/1
80-12.5 TABLET, FILM COATED ORAL
Refills: 0 | Status: ACTIVE | COMMUNITY

## 2024-03-22 RX ORDER — CLOPIDOGREL 75 MG/1
75 TABLET, FILM COATED ORAL
Refills: 0 | Status: ACTIVE | COMMUNITY

## 2024-03-22 RX ORDER — LORATADINE 10 MG/1
10 TABLET ORAL
Refills: 0 | Status: ACTIVE | COMMUNITY

## 2024-03-22 RX ORDER — PANTOPRAZOLE 40 MG/1
40 TABLET, DELAYED RELEASE ORAL
Refills: 0 | Status: ACTIVE | COMMUNITY

## 2024-03-22 RX ORDER — CHOLECALCIFEROL (VITAMIN D3) 125 MCG
TABLET ORAL
Refills: 0 | Status: ACTIVE | COMMUNITY

## 2024-03-22 RX ORDER — DEXTROMETHORPHAN POLISTIREX 30 MG/5 ML
500-1000-40 SUSPENSION, EXTENDED RELEASE 12 HR ORAL
Refills: 0 | Status: ACTIVE | COMMUNITY

## 2024-03-25 ENCOUNTER — NON-APPOINTMENT (OUTPATIENT)
Age: 59
End: 2024-03-25

## 2024-03-25 LAB
24R-OH-CALCIDIOL SERPL-MCNC: 51 PG/ML
ALBUMIN SERPL ELPH-MCNC: 5 G/DL
ALP BLD-CCNC: 70 U/L
ALT SERPL-CCNC: 16 U/L
AMYLASE/CREAT SERPL: 50 U/L
ANION GAP SERPL CALC-SCNC: 14 MMOL/L
AST SERPL-CCNC: 26 U/L
BILIRUB SERPL-MCNC: 0.4 MG/DL
BUN SERPL-MCNC: 14 MG/DL
CALCIUM SERPL-MCNC: 9.7 MG/DL
CANCER AG19-9 SERPL-ACNC: 9 U/ML
CEA SERPL-MCNC: 0.7 NG/ML
CHLORIDE SERPL-SCNC: 101 MMOL/L
CHOLEST SERPL-MCNC: 212 MG/DL
CO2 SERPL-SCNC: 26 MMOL/L
CREAT SERPL-MCNC: 0.98 MG/DL
EGFR: 66 ML/MIN/1.73M2
ERYTHROCYTE [SEDIMENTATION RATE] IN BLOOD BY WESTERGREN METHOD: 6 MM/HR
FERRITIN SERPL-MCNC: 71 NG/ML
GGT SERPL-CCNC: 13 U/L
GLIADIN IGA SER QL: <5 UNITS
GLIADIN IGG SER QL: <5 UNITS
GLIADIN PEPTIDE IGA SER-ACNC: NEGATIVE
GLIADIN PEPTIDE IGG SER-ACNC: NEGATIVE
GLUCOSE SERPL-MCNC: 107 MG/DL
HBV CORE IGG+IGM SER QL: REACTIVE
HBV CORE IGM SER QL: NONREACTIVE
HBV E AB SER QL: NONREACTIVE
HBV E AG SER QL: NONREACTIVE
HBV SURFACE AB SER QL: REACTIVE
HBV SURFACE AG SER QL: NONREACTIVE
HCT VFR BLD CALC: 37.7 %
HCV AB SER QL: NONREACTIVE
HCV S/CO RATIO: 0.11 S/CO
HDLC SERPL-MCNC: 76 MG/DL
HEPATITIS A IGG ANTIBODY: REACTIVE
HGB BLD-MCNC: 11.9 G/DL
IGA SER QL IEP: 128 MG/DL
IRON SATN MFR SERPL: 13 %
IRON SERPL-MCNC: 50 UG/DL
LDLC SERPL CALC-MCNC: 122 MG/DL
LPL SERPL-CCNC: 30 U/L
MCHC RBC-ENTMCNC: 28.6 PG
MCHC RBC-ENTMCNC: 31.6 GM/DL
MCV RBC AUTO: 90.6 FL
NONHDLC SERPL-MCNC: 136 MG/DL
PLATELET # BLD AUTO: 235 K/UL
POTASSIUM SERPL-SCNC: 5 MMOL/L
PROT SERPL-MCNC: 7.4 G/DL
RBC # BLD: 4.16 M/UL
RBC # FLD: 12.3 %
SODIUM SERPL-SCNC: 141 MMOL/L
TIBC SERPL-MCNC: 377 UG/DL
TRIGL SERPL-MCNC: 78 MG/DL
TSH SERPL-ACNC: 1.66 UIU/ML
TTG IGA SER IA-ACNC: <1.2 U/ML
TTG IGA SER-ACNC: NEGATIVE
TTG IGG SER IA-ACNC: 1.2 U/ML
TTG IGG SER IA-ACNC: NEGATIVE
UIBC SERPL-MCNC: 327 UG/DL
WBC # FLD AUTO: 3.88 K/UL

## 2024-03-26 LAB
ENDOMYSIUM IGA SER QL: NEGATIVE
ENDOMYSIUM IGA TITR SER: NORMAL

## 2024-03-27 ENCOUNTER — NON-APPOINTMENT (OUTPATIENT)
Age: 59
End: 2024-03-27

## 2024-03-27 LAB — ANA SER IF-ACNC: NEGATIVE

## 2024-04-16 ENCOUNTER — APPOINTMENT (OUTPATIENT)
Dept: MRI IMAGING | Facility: CLINIC | Age: 59
End: 2024-04-16
Payer: MEDICARE

## 2024-04-16 PROCEDURE — A9585: CPT

## 2024-04-16 PROCEDURE — 74183 MRI ABD W/O CNTR FLWD CNTR: CPT

## 2024-04-19 ENCOUNTER — NON-APPOINTMENT (OUTPATIENT)
Age: 59
End: 2024-04-19

## 2024-06-19 ENCOUNTER — APPOINTMENT (OUTPATIENT)
Dept: GASTROENTEROLOGY | Facility: CLINIC | Age: 59
End: 2024-06-19
Payer: MEDICARE

## 2024-06-19 VITALS
SYSTOLIC BLOOD PRESSURE: 120 MMHG | WEIGHT: 151 LBS | HEIGHT: 63 IN | HEART RATE: 66 BPM | BODY MASS INDEX: 26.75 KG/M2 | TEMPERATURE: 98.3 F | OXYGEN SATURATION: 98 % | DIASTOLIC BLOOD PRESSURE: 56 MMHG

## 2024-06-19 DIAGNOSIS — K62.5 HEMORRHAGE OF ANUS AND RECTUM: ICD-10-CM

## 2024-06-19 DIAGNOSIS — R11.0 NAUSEA: ICD-10-CM

## 2024-06-19 DIAGNOSIS — R10.12 RIGHT UPPER QUADRANT PAIN: ICD-10-CM

## 2024-06-19 DIAGNOSIS — R93.89 ABNORMAL FINDINGS ON DIAGNOSTIC IMAGING OF OTHER SPECIFIED BODY STRUCTURES: ICD-10-CM

## 2024-06-19 DIAGNOSIS — R10.11 RIGHT UPPER QUADRANT PAIN: ICD-10-CM

## 2024-06-19 DIAGNOSIS — K21.9 GASTRO-ESOPHAGEAL REFLUX DISEASE W/OUT ESOPHAGITIS: ICD-10-CM

## 2024-06-19 DIAGNOSIS — K86.2 CYST OF PANCREAS: ICD-10-CM

## 2024-06-19 DIAGNOSIS — K59.00 CONSTIPATION, UNSPECIFIED: ICD-10-CM

## 2024-06-19 PROCEDURE — 99214 OFFICE O/P EST MOD 30 MIN: CPT

## 2024-06-19 RX ORDER — SUCRALFATE 1 G/10ML
1 SUSPENSION ORAL
Qty: 1200 | Refills: 3 | Status: ACTIVE | COMMUNITY
Start: 2024-06-19 | End: 1900-01-01

## 2024-06-19 RX ORDER — HYDROCORTISONE 25 MG/G
2.5 CREAM TOPICAL 3 TIMES DAILY
Qty: 2 | Refills: 3 | Status: ACTIVE | COMMUNITY
Start: 2024-06-19 | End: 1900-01-01

## 2024-06-19 RX ORDER — SIMETHICONE 125 MG/1
125 TABLET, CHEWABLE ORAL
Qty: 120 | Refills: 3 | Status: ACTIVE | COMMUNITY
Start: 2024-06-19 | End: 1900-01-01

## 2024-06-19 RX ORDER — DICYCLOMINE HYDROCHLORIDE 10 MG/1
10 CAPSULE ORAL 3 TIMES DAILY
Qty: 90 | Refills: 3 | Status: ACTIVE | COMMUNITY
Start: 2024-06-19 | End: 1900-01-01

## 2024-06-19 NOTE — HISTORY OF PRESENT ILLNESS
[de-identified] : .The patient had an endoscopic ultrasound performed by Dr. John Saini on October 4, 2022 revealed erythematous mucosa in the stomach that was biopsied, a 1.3 cm cyst seen endosonographically in the pancreatic body.  The surgical pathology performed on October 4, 2022 revealed chronic inactive gastritis with foveolar hyperplasia that was negative for H. pylori and negative for intestinal metaplasia.   [de-identified] : The CAT scan of the abdomen and pelvis with IV contrast performed on September 26, 2022 revealed no acute intra-abdominal findings.  A 1.4 cm pancreatic cystic lesion and additional subcentimeter hypodensities.  Also noted with degenerative changes of the bones, grade 1 anterolisthesis of L5 on S1, a small fat-containing umbilical hernia, atherosclerotic changes of the vessels, status post hysterectomy, status postcholecystectomy,.   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 Also noted was mild chronic compression deformity of the T12 vertebral body, bilateral renal cysts measuring up to 1.7 cm scattered small hepatic cysts, status postcholecystectomy.    The MRI of the abdomen with and without IV contrast performed on August 25, 2022 revealed a small fat-containing left paramedian periumbilical ventral hernia, a 1.5 cm pancreatic cystic lesion likely representing a small sidebranch intraductal papillary mucinous neoplasm without evidence of worrisome features at this time.  Also noted was normal renal hepatic cysts. [de-identified] : The abdominal ultrasound of the abdominal wall performed on July 22, 2022 revealed a 2.4 cm heterogeneous structure in the abdominal wall near the umbilicus likely representing a hernia however given the internal vascularity within this lesion this should be confirmed on CT of the abdomen and pelvis to exclude abdominal wall mass.  Also noted was mildly dilated common bile duct without intrahepatic biliary dilatation likely related to prior cholecystectomy and benign hepatic cyst, benign-appearing renal cysts.

## 2024-06-19 NOTE — ASSESSMENT
[FreeTextEntry1] : Abdominal Pain: The patient complains of abdominal pain. The patient is to avoid nonsteroidal anti-inflammatory drugs and aspirin.  I recommend a low FOD-MAP diet.  I recommend a trial of Dicyclomine 10 mg tablet PO 3 times a day PRN for the abdominal pain. Dyspepsia: The patient complains of dyspeptic symptoms.  The patient was advised to continue to abide by an anti-gas (low FOD-MAP) diet.  The patient was previously given a pamphlet for anti-gas (low FOD-MAP).  The patient and I reviewed the anti-gas (low FOD-MAP) diet at length again. The patient is to continue on a trial of Simethicone one tablet 4 times a day p.r.n. abdominal pain and gas. GERD: The patient was advised to avoid late-night meals and dietary indiscretions.  The patient was advised to avoid fried and fatty foods.  The patient was advised to abide by an anti-GERD diet. The patient was given a pamphlet for anti-GERD.  The patient and I reviewed the anti-GERD diet at length. I recommend a trial of Sucralfate suspension 1 gram/10 cc 4 times a day x 3 months for the symptoms. Nausea: The patient complains of nausea. If the symptoms of nausea persists, the patient may require a trial of Zofran 4 mg twice a day.  Atypical Chest Pain: The patient complains of atypical chest pain of unclear etiology.  The patient was advised to follow up with the PMD and cardiologist regarding evaluation for the atypical chest pain. The patient was told of possible etiologies such as cardiac, pulmonary, GI, musculoskeletal, stress and other causes for the atypical chest pain.  The patient agrees and will follow-up with the PMD and cardiologist.  Constipation: The patient complains of constipation. I recommend a high-fiber diet. I recommend a trial of a probiotic such as Align once a day. I recommend a trial of Metamucil once a day for fiber supplementation. I recommend a trial of Miralax 1 packet once a day for the constipation. If the symptoms persist, the patient may require a colonoscopy to assess the symptoms.  The patient was told of the risks and benefits of the procedure.  The patient was told of the risks of perforation, emergency surgery, bleeding, infections and missed lesions.  The patient agreed and will followup to reassess the symptoms.   Rectal Bleeding: The patient had episodes of rectal bleeding.  The etiology of the rectal bleeding is unclear.  I recommend a trial of Anusol HC suppositories one per rectum QHS and Anusol HC 2.5% cream apply to affected area twice a day PRN hemorrhoidal bleeding or pain.  I recommend a trial of Calmoseptine cream apply to affected area twice a day for rectal discomfort. I recommend Tucks pads for the hemorrhoids.  I recommend starting Sitz baths twice a day for the hemorrhoids.  I recommend avoid wearing tight underwear and use boxers. I recommend avoid touching the perineal area. If the symptoms persist, the patient may require a colonoscopy to assess the site of bleeding. The patient was told of the risks and benefits of the procedure.  The patient was told of the risks of perforation, emergency surgery, bleeding, infections and missed lesions. The patient agrees and will follow up to assess the symptoms. Prior Hospitalization The patient was previously hospitalized at the [facility] [place]. The patient had blood work and imaging studies to assess the symptoms. I reviewed the hospital records, blood work and imaging studies performed in the hospital. Prior Endoscopic Procedures: The patient had a prior upper endoscopy and colonoscopy performed by another gastroenterologist, Dr. Thapa. I will try to obtain the prior upper endoscopy and colonoscopy reports. The patient is to sign a record release to obtain those records. Abnormal Imaging Study: The MRI of the abdomen with and without IV contrast performed on August 25, 2022 revealed a small fat-containing left paramedian periumbilical ventral hernia, a 1.5 cm pancreatic cystic lesion likely representing a small sidebranch intraductal papillary mucinous neoplasm without evidence of worrisome features at this time. Also noted was normal renal hepatic cysts. The prior CAT scan of the abdomen and pelvis with IV contrast performed on September 26, 2022 revealed no acute intra-abdominal findings. A 1.4 cm pancreatic cystic lesion and additional subcentimeter hypodensities. Also noted with degenerative changes of the bones, grade 1 anterolisthesis of L5 on S1, a small fat-containing umbilical hernia, atherosclerotic changes of the vessels, status post hysterectomy, status postcholecystectomy. Pancreatic Cyst: The patient had a prior MRI of the abdomen performed that revealed a pancreatic cyst. I recommend blood work to assess the pancreas. 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 Also noted was mild chronic compression deformity of the T12 vertebral body, bilateral renal cysts measuring up to 1.7 cm scattered small hepatic cysts, status postcholecystectomy.   Follow-up: The patient is to follow-up in the office in 6 months to reassess the symptoms. The patient was told to call the office if any further problems.

## 2024-12-03 NOTE — ED PROVIDER NOTE - CLINICAL SUMMARY MEDICAL DECISION MAKING FREE TEXT BOX
55 y/o F with PMHx of HTN, HLD, GERD presenting with left sided abdominal pain since last night.  Patient has diffuse abdominal tenderness worse in the LUQ.   Plan for basic labs, UA, CT abd pelvis  DDx SBO vs. diverticulitis vs. pancreatitis vs. pancreatic cystic lesion  Dispo likely d/c if workup and scan stable with f/u with GI 5

## 2024-12-30 NOTE — PATIENT PROFILE ADULT - DATE OF LAST VACCINATION
- Blood work showed elevated cholesterol levels to which cholesterol-lowering medication is advocated; will trial atorvastatin 10 mg daily  -Continue to focus on lifestyle modification   04-Sep-2022

## 2025-01-06 ENCOUNTER — APPOINTMENT (OUTPATIENT)
Dept: GASTROENTEROLOGY | Facility: CLINIC | Age: 60
End: 2025-01-06

## 2025-01-23 NOTE — PRE-OP CHECKLIST - DNR CLARIFICATION FORM COMPLETED
Assessment & Plan     1. Primary osteoarthritis of right knee    2. Chronic pain of right knee    3. Right knee pain, unspecified chronicity    4. Encounter for lower extremity comparison imaging study      Orders Placed This Encounter   Procedures    Large joint arthrocentesis: R knee    XR knee 4+ vw right injury    XR knee 1 or 2 vw left    Ambulatory Referral to Physical Therapy       The patient has moderate right knee arthritis.  We discussed treatment options as well as risks and benefits of treatment options.  After a discussion of risks and benefits the patient elected to proceed with a right knee steroid injection today.  Patient should ice and avoid strenuous activity for 1-2 days if needed.  Patient should avoid vaccines for 2 weeks if possible.  If patient is diabetic should also monitor glucose over the next 7 to 10 days.   Can take Tylenol 1,000mg by mouth every 8 hours as needed for pain.  Do not exceed 3,000mg per day.    Patient may take OTC NSAIDs as needed.  Continue ice, heat, topical Advil cream as needed.  Continue OTC bracing.  Patient will continue to monitor her right knee symptoms.  If patient does not receive symptomatic relief from right knee steroid injection and physical therapy she may call to have right knee MRI ordered.  Otherwise patient will follow-up in 3 months for repeat right knee CSI.    Return in about 3 months (around 4/23/2025) for Right knee CSI.    I answered all of the patient's questions during the visit and provided education of the patient's condition during the visit.  The patient verbalized understanding of the information given and agrees with the plan.  This note was dictated using [employer] software.  It may contain errors including improperly dictated words.  Please contact physician directly for any questions.    History of Present Illness   Chief complaint:   Chief Complaint   Patient presents with    Right Knee - Pain     Pt present for RT Knee pain. Pt  states previous Visco injection in Dec wasn't as effective as CSI in Apr. Torn Miniscus. Swelling constantly present. Unsure if fluid is inside. Aggravations include extended walking and sleeping on the RT side (pressure). No traveling pain. Popping sensation at random. Random spasm pain when walking.        HPI: Wanda Schaffer is a 68 y.o. female that c/o right knee pain.    Length of time knee pain has been present: 6 years  Any falls or trauma associated with onset of pain: no elana but is active  Location of pain: generalized   Intermittent or constant: constant  Description of pain: achy sharp   Aggravating factors: prolonged walking, steps   Instability or locking: reports instability   Pain medication that has been tried: no, due to personal preference, occasional tylenol  Topical mediation that has been tried: yes  Has heat/ice/elevation been tried: yes  Can NSAIDs be taken?  If not why?: yes  Has PT or home exercises been tried?: no  Has bracing been tried? OTC or rx?  Had short hinged knee brace with no relief.  Have injections been tried?  Steroid/visco?:  Received gel injection into right knee on 2024 by Dr. Gilbert. Had CSI in 2019 with great relief.   Any history of surgery on that knee?:  no       ROS:    See HPI for musculoskeletal review.   All other systems reviewed are negative     Historical Information   Past Medical History:   Diagnosis Date    Asthma     Colon polyp     Diabetes mellitus (HCC)     Disease of thyroid gland     GERD (gastroesophageal reflux disease)     Hyperlipidemia     Hypertension     Obesity     Pre-diabetes     Seasonal allergies     Thyroid disease     Tinnitus      Past Surgical History:   Procedure Laterality Date     SECTION      Triplets    COLONOSCOPY      COLONOSCOPY W/ POLYPECTOMY  2020    repeat     HYSTERECTOMY      age 47    REDUCTION MAMMAPLASTY  2009     Social History   Social History     Substance and Sexual Activity   Alcohol Use Yes     Alcohol/week: 4.0 standard drinks of alcohol    Types: 4 Glasses of wine per week    Comment: social     Social History     Substance and Sexual Activity   Drug Use No     Social History     Tobacco Use   Smoking Status Former    Current packs/day: 0.00    Average packs/day: 0.5 packs/day for 38.2 years (19.1 ttl pk-yrs)    Types: Cigarettes    Start date:     Quit date: 3/18/2010    Years since quittin.8    Passive exposure: Past   Smokeless Tobacco Never     Family History:   Family History   Problem Relation Age of Onset    Coronary artery disease Maternal Grandfather     Breast cancer Maternal Aunt 58    Stroke Mother     No Known Problems Father     No Known Problems Sister     No Known Problems Daughter     No Known Problems Maternal Grandmother     No Known Problems Paternal Grandmother     No Known Problems Paternal Grandfather     No Known Problems Daughter     No Known Problems Daughter     No Known Problems Sister     No Known Problems Sister     No Known Problems Sister     No Known Problems Sister     No Known Problems Sister     No Known Problems Sister     No Known Problems Maternal Aunt     No Known Problems Maternal Aunt     No Known Problems Maternal Aunt     No Known Problems Maternal Aunt     No Known Problems Maternal Aunt     No Known Problems Paternal Aunt     No Known Problems Paternal Aunt        Current Outpatient Medications on File Prior to Visit   Medication Sig Dispense Refill    Fluticasone-Salmeterol (Advair Diskus) 100-50 mcg/dose inhaler Inhale 1 puff 2 (two) times a day Rinse mouth after use. 60 blister 1    glucose blood test strip Checks blood glucose 3 times a day. 100 each 3    hydroCHLOROthiazide 25 mg tablet Take 1 tablet (25 mg total) by mouth daily 90 tablet 0    Lancets (OneTouch Delica Plus [id number]) MISC Test 1 time a day 100 each 0    levothyroxine 50 mcg tablet Take 1 tablet (50 mcg total) by mouth daily 90 tablet 3    Multiple Vitamins-Minerals (MULTIVITAMIN  "ADULT PO) 1 tablet every 24 hours      olmesartan (BENICAR) 40 mg tablet Take 1 tablet (40 mg total) by mouth daily 90 tablet 3    rosuvastatin (CRESTOR) 5 mg tablet Take 1 tablet (5 mg total) by mouth daily 90 tablet 3    Tirzepatide 7.5 MG/0.5ML SOAJ Inject 7.5 mg under the skin once a week 2 mL 5     No current facility-administered medications on file prior to visit.     Allergies   Allergen Reactions    Seasonal Ic [Octacosanol] Allergic Rhinitis       Current Outpatient Medications on File Prior to Visit   Medication Sig Dispense Refill    Fluticasone-Salmeterol (Advair Diskus) 100-50 mcg/dose inhaler Inhale 1 puff 2 (two) times a day Rinse mouth after use. 60 blister 1    glucose blood test strip Checks blood glucose 3 times a day. 100 each 3    hydroCHLOROthiazide 25 mg tablet Take 1 tablet (25 mg total) by mouth daily 90 tablet 0    Lancets (OneTouch Delica Plus [id number]) MISC Test 1 time a day 100 each 0    levothyroxine 50 mcg tablet Take 1 tablet (50 mcg total) by mouth daily 90 tablet 3    Multiple Vitamins-Minerals (MULTIVITAMIN ADULT PO) 1 tablet every 24 hours      olmesartan (BENICAR) 40 mg tablet Take 1 tablet (40 mg total) by mouth daily 90 tablet 3    rosuvastatin (CRESTOR) 5 mg tablet Take 1 tablet (5 mg total) by mouth daily 90 tablet 3    Tirzepatide 7.5 MG/0.5ML SOAJ Inject 7.5 mg under the skin once a week 2 mL 5     No current facility-administered medications on file prior to visit.       Objective   Vitals: Height 5' 2\" (1.575 m), weight 90.7 kg (200 lb), not currently breastfeeding.,Body mass index is 36.58 kg/m².    PE:  AAOx 3  WDWN  Hearing intact, no drainage from eyes  Regular rate  no audible wheezing  no abdominal distension  LE compartments soft, skin intact    rightknee:    Appearance:  mild swelling   No ecchymosis  no obvious joint deformity   No effusion  Palpation/Tenderness:  +TTP over medial joint line  + TTP over lateral joint line   No TTP over patella  No TTP over " patellar tendon  No TTP over pes anserine bursa  Active Range of Motion:  AROM: 0-120  Special Tests:  Medial Therese's Test:  negative  Lateral Therese's Test:  Negative  Lachman's Test:  negative  Anterior Drawer Test:  Negative  Patellar grind:  Negative  Valgus Stress Test:  negative  Varus Stress Test:  negative     No ipsilateral hip pain with ROM    rightLE:    Sensation grossly intact  Palpable 2+ pulse  AT/GS/EHL intact    Imaging Studies: Results Review Statement: I personally reviewed the following image studies in PACS and associated radiology reports: xray(s). My interpretation of the radiology images/reports is: Moderate right knee osteoarthritis.      Large joint arthrocentesis: R knee  [place] Protocol:  procedure performed by consultantConsent: Verbal consent obtained.  Risks and benefits: risks, benefits and alternatives were discussed  Consent given by: patient  Patient understanding: patient states understanding of the procedure being performed  Site marked: the operative site was marked  Patient identity confirmed: verbally with patient  Supporting Documentation  Indications: pain   Procedure Details  Location: knee - R knee  Needle size: 22 G  Ultrasound guidance: no  Approach: anterolateral  Medications administered: 2 mL bupivacaine 0.25 %; 40 mg triamcinolone acetonide 40 mg/mL    Patient tolerance: patient tolerated the procedure well with no immediate complications  Dressing:  Sterile dressing applied          Scribe Attestation      I,:  Yovany Ulrich am acting as a scribe while in the presence of the attending physician.:       I,:  Ayanna Riggs DO personally performed the services described in this documentation    as scribed in my presence.:                 n/a

## (undated) DEVICE — TUBING MEDI-VAC W MAXIGRIP CONNECTORS 1/4"X6'

## (undated) DEVICE — DENTURE CUP PINK

## (undated) DEVICE — LINE BREATHE SAMPLNG

## (undated) DEVICE — TUBING IV SET GRAVITY 3Y 100" MACRO

## (undated) DEVICE — BITE BLOCK ADULT 20 X 27MM (GREEN)

## (undated) DEVICE — LUBRICATING JELLY HR ONE SHOT 3G

## (undated) DEVICE — BASIN EMESIS 10IN GRADUATED MAUVE

## (undated) DEVICE — BIOPSY FORCEP COLD DISP

## (undated) DEVICE — DRSG CURITY GAUZE SPONGE 4 X 4" 12-PLY NON-STERILE

## (undated) DEVICE — PACK IV START WITH CHG

## (undated) DEVICE — DRSG BANDAID 0.75X3"

## (undated) DEVICE — CONTAINER FORMALIN 80ML YELLOW

## (undated) DEVICE — CATH IV SAFE BC 22G X 1" (BLUE)

## (undated) DEVICE — ELCTR ECG CONDUCTIVE ADHESIVE

## (undated) DEVICE — NDL ASPIR EXPECT SLIMLINE 22G

## (undated) DEVICE — UNDERPAD LINEN SAVER 17 X 24"

## (undated) DEVICE — SALIVA EJECTOR (BLUE)

## (undated) DEVICE — GOWN LG

## (undated) DEVICE — DRSG 2X2

## (undated) DEVICE — CLAMP BX HOT RAD JAW 3

## (undated) DEVICE — BIOPSY FORCEP RADIAL JAW 4 STANDARD WITH NEEDLE